# Patient Record
Sex: FEMALE | Race: WHITE | NOT HISPANIC OR LATINO | Employment: OTHER
[De-identification: names, ages, dates, MRNs, and addresses within clinical notes are randomized per-mention and may not be internally consistent; named-entity substitution may affect disease eponyms.]

---

## 2020-06-09 ENCOUNTER — RESULTS ONLY (OUTPATIENT)
Dept: LAB | Age: 30
End: 2020-06-09

## 2020-06-09 ENCOUNTER — APPOINTMENT (OUTPATIENT)
Dept: URGENT CARE | Facility: URGENT CARE | Age: 30
End: 2020-06-09

## 2020-06-10 LAB
SARS-COV-2 RNA SPEC QL NAA+PROBE: NOT DETECTED
SPECIMEN SOURCE: NORMAL

## 2020-11-06 ENCOUNTER — AMBULATORY - HEALTHEAST (OUTPATIENT)
Dept: FAMILY MEDICINE | Facility: CLINIC | Age: 30
End: 2020-11-06

## 2020-11-06 ENCOUNTER — VIRTUAL VISIT (OUTPATIENT)
Dept: FAMILY MEDICINE | Facility: OTHER | Age: 30
End: 2020-11-06

## 2020-11-06 DIAGNOSIS — Z20.822 SUSPECTED COVID-19 VIRUS INFECTION: ICD-10-CM

## 2020-11-06 NOTE — PROGRESS NOTES
"Date: 2020 07:33:43  Clinician: Buster Liriano  Clinician NPI: [id number]  Patient: Princess Estrella  Patient : 1990  Patient Address: [address] [place] [address]  Patient Phone: [phone]  Visit Protocol: URI  Patient Summary:  Princess is a 30 year old ( : 1990 ) female who initiated a [facility] Visit for COVID-19 (Coronavirus) evaluation and screening. When asked the question \"Please sign me up to receive news, health information and promotions. \", Princess responded \"No\".    Princess states her symptoms started 1-2 days ago.   Her symptoms consist of vomiting, myalgia, chills, malaise, a sore throat, ageusia, a headache, nasal congestion, nausea, and anosmia. Princess also feels feverish but was unable to measure her temperature.   Symptom details     Nasal secretions: The color of her mucus is yellow.    Sore throat: Princess reports having moderate throat pain (4-6 on a 10 point pain scale), does not have exudate on her tonsils, and can swallow liquids. She is not sure if the lymph nodes in her neck are enlarged. A rash has not appeared on the skin since the sore throat started.     Headache: She states the headache is moderate (4-6 on a 10 point pain scale).      Princess denies having rhinitis, facial pain or pressure, teeth pain, diarrhea, ear pain, wheezing, and cough. She also denies taking antibiotic medication in the past month, having recent facial or sinus surgery in the past 60 days, and having a sinus infection within the past year. She is not experiencing dyspnea.   Precipitating events  Within the past week, Princess has not been exposed to someone with strep throat. She has not recently been exposed to someone with influenza. Princess has not been in close contact with any high risk individuals.   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   Princess has had a close contact with a laboratory-confirmed COVID-19 patient within 14 days of symptom onset. She was exposed at her work. Date Princess was exposed to the laboratory-confirmed COVID-19 patient: 11/02/2020   Additional information about contact with COVID-19 (Coronavirus) patient as reported by the patient (free text): I have been working directly with a patient who tested positive and employees    Since December 2019, Princess has been tested for COVID-19 and has not had upper respiratory infection or influenza-like illness.      Result of COVID-19 test: Negative     Date of her COVID-19 test: 08/20/2020      Pertinent medical history  Princess does not get yeast infections when she takes antibiotics.   Princess does not need a return to work/school note.   Weight: 150 lbs   Princess does not smoke or use smokeless tobacco.   She denies pregnancy and denies breastfeeding. She has menstruated in the past month.   Weight: 150 lbs    MEDICATIONS: No current medications, ALLERGIES: NKDA  Clinician Response:  Dear Princess,   Need to be retested:     Your symptoms show that you may have coronavirus (COVID-19). This illness can cause fever, cough and trouble breathing. Many people get a mild case and get better on their own. Some people can get very sick.  What should I do?  We would like to test you for this virus.   1. Please call [phone] to schedule your visit. Explain that you were referred by [facility] to have a COVID-19 test. Be ready to share your [facility] visit ID number.  * If you need to schedule in [facility] please call [phone] or for [facility] employees please call [phone].  * If you need to schedule in the [place] area please call  "[phone]. [employer] employees call [phone].  The following will serve as your written order for this COVID Test, ordered by me, for the indication of suspected COVID [Z20.828]: The test will be ordered in [employer], our electronic health record, after you are scheduled. It will show as ordered and authorized by Earnest Washington MD.  Order: COVID-19 (Coronavirus) PCR for SYMPTOMATIC testing from [facility].   2. When it's time for your COVID test:  Stay at least 6 feet away from others. (If someone will drive you to your test, stay in the backseat, as far away from the  as you can.)   Cover your mouth and nose with a mask, tissue or washcloth.  Go straight to the testing site. Don't make any stops on the way there or back.      3.Starting now: Stay home and away from others (self-isolate) until:   You've had no fever---and no medicine that reduces fever---for one full day (24 hours). And...   Your other symptoms have gotten better. For example, your cough or breathing has improved. And...   At least 10 days have passed since your symptoms started.       During this time, don't leave the house except for testing or medical care.   Stay in your own room, even for meals. Use your own bathroom if you can.   Stay away from others in your home. No hugging, kissing or shaking hands. No visitors.  Don't go to work, school or anywhere else.    Clean \"high touch\" surfaces often (doorknobs, counters, handles, etc.). Use a household cleaning spray or wipes. You'll find a full list of  on the EPA website: www.epa.gov/pesticide-registration/list-n-disinfectants-use-against-sars-cov-2.   Cover your mouth and nose with a mask, tissue or washcloth to avoid spreading germs.  Wash your hands and face often. Use soap and water.  Caregivers in these groups are at risk for severe illness due to COVID-19:  o People 65 years and older  o People who live in a nursing home or long-term care facility  o People with chronic disease (lung, " heart, cancer, diabetes, kidney, liver, immunologic)  o People who have a weakened immune system, including those who:   Are in cancer treatment  Take medicine that weakens the immune system, such as corticosteroids  Had a bone marrow or organ transplant  Have an immune deficiency  Have poorly controlled HIV or AIDS  Are obese (body mass index of 40 or higher)  Smoke regularly   o Caregivers should wear gloves while washing dishes, handling laundry and cleaning bedrooms and bathrooms.  o Use caution when washing and drying laundry: Don't shake dirty laundry, and use the warmest water setting that you can.  o For more tips, go to www.cdc.gov/coronavirus/2019-ncov/downloads/10Things.pdf.    4.Sign up for [employer]. We know it's scary to hear that you might have COVID-19. We want to track your symptoms to make sure you're okay over the next 2 weeks. Please look for an email from [employer]---this is a free, online program that we'll use to keep in touch. To sign up, follow the link in the email. Learn more at [email or web address]  How can I take care of myself?   Get lots of rest. Drink extra fluids (unless a doctor has told you not to).   Take Tylenol (acetaminophen) for fever or pain. If you have liver or kidney problems, ask your family doctor if it's okay to take Tylenol.   Adults can take either:    650 mg (two 325 mg pills) every 4 to 6 hours, or...   1,000 mg (two 500 mg pills) every 8 hours as needed.    Note: Don't take more than 3,000 mg in one day. Acetaminophen is found in many medicines (both prescribed and over-the-counter medicines). Read all labels to be sure you don't take too much.   For children, check the Tylenol bottle for the right dose. The dose is based on the child's age or weight.    If you have other health problems (like cancer, heart failure, an organ transplant or severe kidney disease): Call your specialty clinic if you don't feel better in the next 2 days.       Know  when to call 911. Emergency warning signs include:    Trouble breathing or shortness of breath Pain or pressure in the chest that doesn't go away Feeling confused like you haven't felt before, or not being able to wake up Bluish-colored lips or face.  Where can I get more information?   [facility] -- About COVID-19: [email or web address]   CDC -- What to Do If You're Sick: www.cdc.gov/coronavirus/2019-ncov/about/steps-when-sick.html   CDC -- Ending Home Isolation: www.cdc.gov/coronavirus/2019-ncov/hcp/disposition-in-home-patients.html   [facility] -- Caring for Someone: www.cdc.gov/coronavirus/2019-ncov/if-you-are-sick/care-for-someone.html   [facility] -- Interim Guidance for Hospital Discharge to Home: www.[facility].mn.us/diseases/coronavirus/hcp/hospdischarge.pdf   [facility] clinical trials (COVID-19 research studies): clinicalaffairs.[facility]-clinical-trials    Below are the COVID-19 hotlines at the [place] Department of Health ([facility]). Interpreters are available.    For health questions: Call 594-370-1456 or 1-767.868.9542 (7 a.m. to 7 p.m.) For questions about schools and childcare: Call 890-416-7680 or 1-950.906.3297 (7 a.m. to 7 p.m.)    Diagnosis: Contact with and (suspected) exposure to other viral communicable diseases  Diagnosis ICD: Z20.828

## 2020-11-08 ENCOUNTER — COMMUNICATION - HEALTHEAST (OUTPATIENT)
Dept: SCHEDULING | Facility: CLINIC | Age: 30
End: 2020-11-08

## 2020-11-29 ENCOUNTER — HEALTH MAINTENANCE LETTER (OUTPATIENT)
Age: 30
End: 2020-11-29

## 2021-07-28 ENCOUNTER — LAB REQUISITION (OUTPATIENT)
Dept: LAB | Facility: CLINIC | Age: 31
End: 2021-07-28

## 2021-07-28 PROCEDURE — 86762 RUBELLA ANTIBODY: CPT | Performed by: INTERNAL MEDICINE

## 2021-07-28 PROCEDURE — 86735 MUMPS ANTIBODY: CPT | Performed by: INTERNAL MEDICINE

## 2021-07-28 PROCEDURE — 86481 TB AG RESPONSE T-CELL SUSP: CPT | Performed by: INTERNAL MEDICINE

## 2021-07-28 PROCEDURE — 86765 RUBEOLA ANTIBODY: CPT | Performed by: INTERNAL MEDICINE

## 2021-07-28 PROCEDURE — 86787 VARICELLA-ZOSTER ANTIBODY: CPT | Performed by: INTERNAL MEDICINE

## 2021-07-28 PROCEDURE — 86706 HEP B SURFACE ANTIBODY: CPT | Performed by: INTERNAL MEDICINE

## 2021-07-29 LAB
HBV SURFACE AB SERPL IA-ACNC: 1.22 M[IU]/ML
MEV IGG SER IA-ACNC: 218 AU/ML
MEV IGG SER IA-ACNC: POSITIVE
MUMPS ANTIBODY IGG INSTRUMENT VALUE: 159 AU/ML
MUV IGG SER QL IA: POSITIVE
RUBV IGG SERPL QL IA: 2.44 INDEX
RUBV IGG SERPL QL IA: POSITIVE
VZV IGG SER QL IA: 389.9 INDEX
VZV IGG SER QL IA: POSITIVE

## 2021-07-30 LAB
QUANTIFERON MITOGEN: 10 IU/ML
QUANTIFERON NIL TUBE: 0.08 IU/ML
QUANTIFERON TB1 TUBE: 0.1 IU/ML
QUANTIFERON TB2 TUBE: 0.11

## 2021-08-01 LAB
GAMMA INTERFERON BACKGROUND BLD IA-ACNC: 0.08 IU/ML
M TB IFN-G BLD-IMP: NEGATIVE
M TB IFN-G CD4+ BCKGRND COR BLD-ACNC: 9.92 IU/ML
MITOGEN IGNF BCKGRD COR BLD-ACNC: 0.02 IU/ML
MITOGEN IGNF BCKGRD COR BLD-ACNC: 0.03 IU/ML

## 2021-09-25 ENCOUNTER — HEALTH MAINTENANCE LETTER (OUTPATIENT)
Age: 31
End: 2021-09-25

## 2021-12-15 ENCOUNTER — OFFICE VISIT (OUTPATIENT)
Dept: OBGYN | Facility: CLINIC | Age: 31
End: 2021-12-15
Payer: COMMERCIAL

## 2021-12-15 VITALS
BODY MASS INDEX: 28.94 KG/M2 | WEIGHT: 168.6 LBS | OXYGEN SATURATION: 97 % | HEART RATE: 80 BPM | DIASTOLIC BLOOD PRESSURE: 77 MMHG | SYSTOLIC BLOOD PRESSURE: 118 MMHG

## 2021-12-15 DIAGNOSIS — Z32.01 PREGNANCY EXAMINATION OR TEST, POSITIVE RESULT: ICD-10-CM

## 2021-12-15 DIAGNOSIS — N91.2 AMENORRHEA: Primary | ICD-10-CM

## 2021-12-15 LAB — HCG UR QL: POSITIVE

## 2021-12-15 PROCEDURE — 81025 URINE PREGNANCY TEST: CPT | Performed by: OBSTETRICS & GYNECOLOGY

## 2021-12-15 PROCEDURE — 99203 OFFICE O/P NEW LOW 30 MIN: CPT | Performed by: OBSTETRICS & GYNECOLOGY

## 2021-12-15 RX ORDER — PRENATAL VIT/IRON FUM/FOLIC AC 27MG-0.8MG
1 TABLET ORAL DAILY
COMMUNITY
End: 2023-05-02

## 2021-12-15 NOTE — PROGRESS NOTES
Princess is a 31 year old,  1 para 0 who presents today with absence of menses. LMP was 10/10/21.  She has had a home pregnancy test that was positive.  She has had associated nausea and fatigue.      OB History    Para Term  AB Living   1 0 0 0 0 0   SAB IAB Ectopic Multiple Live Births   0 0 0 0 0      # Outcome Date GA Lbr Rosendo/2nd Weight Sex Delivery Anes PTL Lv   1 Current                Gynecological History         LMP:  10/10/2021      No STD/ No PID/ No IUD use  No abnormal pap smears       Past Medical History:   Diagnosis Date     No pertinent past medical history        Past Surgical History:   Procedure Laterality Date     NO HISTORY OF SURGERY         No Known Allergies    Current Outpatient Medications   Medication Sig Dispense Refill     Prenatal Vit-Fe Fumarate-FA (PRENATAL MULTIVITAMIN W/IRON) 27-0.8 MG tablet Take 1 tablet by mouth daily         Social History     Tobacco Use     Smoking status: Former Smoker     Types: Cigarettes     Smokeless tobacco: Never Used   Substance Use Topics     Alcohol use: No     Drug use: No       No family history on file.      ROS:    4 point ROS including Respiratory, CV, GI and , other than that noted in the HPI and the PMH,  is negative     EXAM:  /77 (BP Location: Right arm, Cuff Size: Adult Regular)   Pulse 80   Wt 76.5 kg (168 lb 9.6 oz)   LMP 10/10/2021   SpO2 97%   BMI 28.94 kg/m    General:  WNWD female, NAD  Alert  Oriented x 3  Gait:  Normal  Skin:  Normal skin turgor  HEENT:  NC/AT, EOMI  Abdomen:  Non-tender, non-distended.  Beside ultrasound performed and CRL =9.3 weeks.  FHT's not heard on doppler   Pelvic exam:  Not performed  Extremities:  No clubbing, no cyanosis and no edema.      A: Missed Menses  Weeks gestation: 9.3 weeks  FRANKY: 2021    P: 1) Prenatal vitamins   2) [facility] ob packet given to patient.  Diet, exercise, work, and environmental hazards reviewed.  Discussed avoidance of tobacco, ETOH, and street  drugs. Signs and symptoms to monitor for and report discussed. Will schedule first OB visit at 10-12 weeks gestation.  She will do this in [place] after she moves.   3) Ultrasound is ordered due to not hearing FHTs on doppler    20 minutes face to face time was spent with the patient today entirely in education and counseling regarding first trimester anticipatory guidance    Lalo Faust MD

## 2021-12-22 ENCOUNTER — ANCILLARY PROCEDURE (OUTPATIENT)
Dept: ULTRASOUND IMAGING | Facility: CLINIC | Age: 31
End: 2021-12-22
Attending: OBSTETRICS & GYNECOLOGY
Payer: COMMERCIAL

## 2021-12-22 DIAGNOSIS — Z32.01 PREGNANCY EXAMINATION OR TEST, POSITIVE RESULT: ICD-10-CM

## 2021-12-22 DIAGNOSIS — N91.2 AMENORRHEA: ICD-10-CM

## 2021-12-22 LAB — RADIOLOGIST FLAGS: ABNORMAL

## 2021-12-22 PROCEDURE — 76801 OB US < 14 WKS SINGLE FETUS: CPT | Performed by: RADIOLOGY

## 2021-12-28 ENCOUNTER — OFFICE VISIT (OUTPATIENT)
Dept: OBGYN | Facility: CLINIC | Age: 31
End: 2021-12-28
Payer: COMMERCIAL

## 2021-12-28 VITALS
SYSTOLIC BLOOD PRESSURE: 113 MMHG | DIASTOLIC BLOOD PRESSURE: 72 MMHG | OXYGEN SATURATION: 95 % | HEART RATE: 88 BPM | BODY MASS INDEX: 29.08 KG/M2 | WEIGHT: 169.4 LBS

## 2021-12-28 DIAGNOSIS — O02.1 MISSED ABORTION WITH FETAL DEMISE BEFORE 20 COMPLETED WEEKS OF GESTATION: Primary | ICD-10-CM

## 2021-12-28 LAB
ABO/RH(D): NORMAL
ANTIBODY SCREEN: NEGATIVE
SPECIMEN EXPIRATION DATE: NORMAL

## 2021-12-28 PROCEDURE — 99213 OFFICE O/P EST LOW 20 MIN: CPT | Performed by: OBSTETRICS & GYNECOLOGY

## 2021-12-28 PROCEDURE — 36415 COLL VENOUS BLD VENIPUNCTURE: CPT | Performed by: OBSTETRICS & GYNECOLOGY

## 2021-12-28 PROCEDURE — 86901 BLOOD TYPING SEROLOGIC RH(D): CPT | Performed by: OBSTETRICS & GYNECOLOGY

## 2021-12-28 PROCEDURE — 86850 RBC ANTIBODY SCREEN: CPT | Performed by: OBSTETRICS & GYNECOLOGY

## 2021-12-28 PROCEDURE — 86900 BLOOD TYPING SEROLOGIC ABO: CPT | Performed by: OBSTETRICS & GYNECOLOGY

## 2021-12-28 NOTE — PROGRESS NOTES
Princess is a 31 year old  (Patient's last menstrual period was 10/10/2021.) at 10.5 weeks based on LMP.  She is here with her partner today for follow up of the  Fetal demise seen on the ultrasound last week.  She has had some cramping.  She has not had any bleeding.     She had the ultrasound and the following is reviewed with her and her partner.   US OB < 14 WEEKS SINGLE-TRANSABDOMINAL 2021 1:15 PM  CLINICAL HISTORY: unable to hear FHT's; Amenorrhea; Pregnancy  examination or test, positive result  TECHNIQUE: Transabdominal scans were performed. Endovaginal ultrasound  was performed to better visualize the embryo.  COMPARISON: None.  FINDINGS:  UTERUS: Single normal appearing intrauterine gestation sac.  CRL: measures 29 mm, equals 9 weeks and 5 days.  FETAL HEART RATE: No fetal cardiac activity is identified.  AMNIOTIC FLUID: Normal.  PLACENTA: Not yet formed. No evidence for sub-chorionic hemorrhage.  RIGHT OVARY: Normal.  LEFT OVARY: Normal.                                                               IMPRESSION:   1.  Single intrauterine gestation at 9 weeks and 5 days. No fetal  cardiac activity is identified on this exam. Per the guidelines below,  these findings are consistent with a failed early pregnancy.  Findings Diagnostic of Pregnancy Failure     CRL >7mm and no FHR  MSD >25 mm and no embryo  Embryo with no FHR >11 days after a previous US showed a gestational  sac with a yolk sac.  Embryo with no FHR >2 weeks after a previous US showed a gestational  sac without a yolk sac.     Reference: Diagnostic Criteria for Nonviable Pregnancy Early in the  First Trimester. Ultrasound Quarterly; 30(1): 3-9      Past Medical History:   Diagnosis Date     No pertinent past medical history        Past Surgical History:   Procedure Laterality Date     NO HISTORY OF SURGERY          No Known Allergies    Current Outpatient Medications   Medication Sig Dispense Refill     Prenatal Vit-Fe  Fumarate-FA (PRENATAL MULTIVITAMIN W/IRON) 27-0.8 MG tablet Take 1 tablet by mouth daily (Patient not taking: Reported on 2021)         Social History     Socioeconomic History     Marital status: Single     Spouse name: Not on file     Number of children: Not on file     Years of education: Not on file     Highest education level: Not on file   Occupational History     Not on file   Tobacco Use     Smoking status: Former Smoker     Types: Cigarettes     Smokeless tobacco: Never Used   Substance and Sexual Activity     Alcohol use: No     Drug use: No     Sexual activity: Yes     Partners: Male     Birth control/protection: None   Other Topics Concern     Not on file   Social History Narrative    05/16/15: 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     Social Determinants of Health     Financial Resource Strain: Not on file   Food Insecurity: Not on file   Transportation Needs: Not on file   Physical Activity: Not on file   Stress: Not on file   Social Connections: Not on file   Intimate Partner Violence: Not on file   Housing Stability: Not on file       No family history on file.      Review of Systems:  10 point ROS of systems including Constitutional, Eyes, Respiratory, Cardiovascular, Gastroenterology, Genitourinary, Integumentary, Muscularskeletal, Psychiatric were all negative except for pertinent positives noted in my HPI and in the PMH.      Exam  /72 (BP Location: Right arm)   Pulse 88   Wt 76.8 kg (169 lb 6.4 oz)   LMP 10/10/2021   SpO2 95%   BMI 29.08 kg/m    General:  WNWD female, NAD  Alert  Oriented x 3  Gait:  Normal  Skin:  Normal skin turgor  HEENT:  NC/AT, EOMI  Abdomen:  Non-tender, non-distended.  Pelvic exam:  Not performed  Extremities:  No clubbing, no cyanosis and no edema.      Assessment:  Missed      Plan:  Blood Type  Reviewed that miscarriage occurs ~ 1 in 5 pregnancy events and that there was no direct event or prevention  that the patient  could have avoided or performed.  Discussed that there are many etiologies for miscarriage, the most common being a genetic anomaly. The risk for a miscarriage increases with advancing maternal age due to a higher incidence of conceptuses with a chromosomal aneuploidy. The risk may approach 75% in women who are 45 years of age and older.  In about 50% of couples with recurrent pregnancy loss, the etiology remains unknown despite a thorough evaluation and is therefore classified as idiopathic. It is estimated that couples with idiopathic recurrent pregnancy loss can have up to a 75% chance of having a successful pregnancy. Reviewed that risk of miscarriage for next pregnancy is not elevated by the current event.  Commonly reported causes of recurrent pregnancy loss include the following:      Endocrine      Environmental agents      Maternal factors (acquired, inherited, structural)      Chromosomal and single gene disorders  We reviewed options of expectant management, D&C, and medical therapy (mefiprestone and cytotec).  Reviewed risks and benefits of all options.  All questions answered.  Patient is opting for observational management.  She will let me know if she does not pass tissue and she desires an interventional method  Questions seem to be answered. .    Lalo Faust MD

## 2022-01-15 ENCOUNTER — HEALTH MAINTENANCE LETTER (OUTPATIENT)
Age: 32
End: 2022-01-15

## 2022-12-26 ENCOUNTER — HEALTH MAINTENANCE LETTER (OUTPATIENT)
Age: 32
End: 2022-12-26

## 2023-04-16 ENCOUNTER — HEALTH MAINTENANCE LETTER (OUTPATIENT)
Age: 33
End: 2023-04-16

## 2023-05-01 ENCOUNTER — MEDICAL CORRESPONDENCE (OUTPATIENT)
Dept: HEALTH INFORMATION MANAGEMENT | Facility: CLINIC | Age: 33
End: 2023-05-01
Payer: COMMERCIAL

## 2023-05-02 ENCOUNTER — ANCILLARY PROCEDURE (OUTPATIENT)
Dept: GENERAL RADIOLOGY | Facility: CLINIC | Age: 33
End: 2023-05-02
Attending: STUDENT IN AN ORGANIZED HEALTH CARE EDUCATION/TRAINING PROGRAM
Payer: COMMERCIAL

## 2023-05-02 ENCOUNTER — OFFICE VISIT (OUTPATIENT)
Dept: FAMILY MEDICINE | Facility: CLINIC | Age: 33
End: 2023-05-02
Payer: COMMERCIAL

## 2023-05-02 VITALS
OXYGEN SATURATION: 96 % | TEMPERATURE: 97.7 F | HEART RATE: 81 BPM | RESPIRATION RATE: 16 BRPM | WEIGHT: 168.2 LBS | DIASTOLIC BLOOD PRESSURE: 73 MMHG | SYSTOLIC BLOOD PRESSURE: 100 MMHG | HEIGHT: 64 IN | BODY MASS INDEX: 28.71 KG/M2

## 2023-05-02 DIAGNOSIS — Z00.00 ROUTINE GENERAL MEDICAL EXAMINATION AT A HEALTH CARE FACILITY: Primary | ICD-10-CM

## 2023-05-02 DIAGNOSIS — Z30.011 ENCOUNTER FOR INITIAL PRESCRIPTION OF CONTRACEPTIVE PILLS: ICD-10-CM

## 2023-05-02 DIAGNOSIS — Z11.4 SCREENING FOR HIV (HUMAN IMMUNODEFICIENCY VIRUS): ICD-10-CM

## 2023-05-02 DIAGNOSIS — M25.441 FINGER JOINT SWELLING, RIGHT: ICD-10-CM

## 2023-05-02 DIAGNOSIS — Z11.59 NEED FOR HEPATITIS C SCREENING TEST: ICD-10-CM

## 2023-05-02 LAB
ALBUMIN SERPL BCG-MCNC: 4.6 G/DL (ref 3.5–5.2)
ALP SERPL-CCNC: 51 U/L (ref 35–104)
ALT SERPL W P-5'-P-CCNC: 14 U/L (ref 10–35)
ANION GAP SERPL CALCULATED.3IONS-SCNC: 13 MMOL/L (ref 7–15)
AST SERPL W P-5'-P-CCNC: 19 U/L (ref 10–35)
BASOPHILS # BLD AUTO: 0 10E3/UL (ref 0–0.2)
BASOPHILS NFR BLD AUTO: 1 %
BILIRUB SERPL-MCNC: 0.3 MG/DL
BUN SERPL-MCNC: 6 MG/DL (ref 6–20)
CALCIUM SERPL-MCNC: 9.8 MG/DL (ref 8.6–10)
CHLORIDE SERPL-SCNC: 102 MMOL/L (ref 98–107)
CREAT SERPL-MCNC: 0.8 MG/DL (ref 0.51–0.95)
CRP SERPL-MCNC: <3 MG/L
DEPRECATED HCO3 PLAS-SCNC: 26 MMOL/L (ref 22–29)
EOSINOPHIL # BLD AUTO: 0.2 10E3/UL (ref 0–0.7)
EOSINOPHIL NFR BLD AUTO: 3 %
ERYTHROCYTE [DISTWIDTH] IN BLOOD BY AUTOMATED COUNT: 13.8 % (ref 10–15)
ERYTHROCYTE [SEDIMENTATION RATE] IN BLOOD BY WESTERGREN METHOD: 5 MM/HR (ref 0–20)
GFR SERPL CREATININE-BSD FRML MDRD: >90 ML/MIN/1.73M2
GLUCOSE SERPL-MCNC: 92 MG/DL (ref 70–99)
HCG UR QL: NEGATIVE
HCT VFR BLD AUTO: 40.6 % (ref 35–47)
HGB BLD-MCNC: 13.5 G/DL (ref 11.7–15.7)
LYMPHOCYTES # BLD AUTO: 2.3 10E3/UL (ref 0.8–5.3)
LYMPHOCYTES NFR BLD AUTO: 37 %
MCH RBC QN AUTO: 28.2 PG (ref 26.5–33)
MCHC RBC AUTO-ENTMCNC: 33.3 G/DL (ref 31.5–36.5)
MCV RBC AUTO: 85 FL (ref 78–100)
MONOCYTES # BLD AUTO: 0.4 10E3/UL (ref 0–1.3)
MONOCYTES NFR BLD AUTO: 7 %
NEUTROPHILS # BLD AUTO: 3.4 10E3/UL (ref 1.6–8.3)
NEUTROPHILS NFR BLD AUTO: 53 %
PLATELET # BLD AUTO: 289 10E3/UL (ref 150–450)
POTASSIUM SERPL-SCNC: 3.7 MMOL/L (ref 3.4–5.3)
PROT SERPL-MCNC: 7.1 G/DL (ref 6.4–8.3)
RBC # BLD AUTO: 4.78 10E6/UL (ref 3.8–5.2)
SODIUM SERPL-SCNC: 141 MMOL/L (ref 136–145)
WBC # BLD AUTO: 6.3 10E3/UL (ref 4–11)

## 2023-05-02 PROCEDURE — 81025 URINE PREGNANCY TEST: CPT | Performed by: STUDENT IN AN ORGANIZED HEALTH CARE EDUCATION/TRAINING PROGRAM

## 2023-05-02 PROCEDURE — 86803 HEPATITIS C AB TEST: CPT | Performed by: STUDENT IN AN ORGANIZED HEALTH CARE EDUCATION/TRAINING PROGRAM

## 2023-05-02 PROCEDURE — 85025 COMPLETE CBC W/AUTO DIFF WBC: CPT | Performed by: STUDENT IN AN ORGANIZED HEALTH CARE EDUCATION/TRAINING PROGRAM

## 2023-05-02 PROCEDURE — 80053 COMPREHEN METABOLIC PANEL: CPT | Performed by: STUDENT IN AN ORGANIZED HEALTH CARE EDUCATION/TRAINING PROGRAM

## 2023-05-02 PROCEDURE — 73130 X-RAY EXAM OF HAND: CPT | Mod: TC | Performed by: RADIOLOGY

## 2023-05-02 PROCEDURE — 86140 C-REACTIVE PROTEIN: CPT | Performed by: STUDENT IN AN ORGANIZED HEALTH CARE EDUCATION/TRAINING PROGRAM

## 2023-05-02 PROCEDURE — 99385 PREV VISIT NEW AGE 18-39: CPT | Performed by: STUDENT IN AN ORGANIZED HEALTH CARE EDUCATION/TRAINING PROGRAM

## 2023-05-02 PROCEDURE — 87389 HIV-1 AG W/HIV-1&-2 AB AG IA: CPT | Performed by: STUDENT IN AN ORGANIZED HEALTH CARE EDUCATION/TRAINING PROGRAM

## 2023-05-02 PROCEDURE — 85652 RBC SED RATE AUTOMATED: CPT | Performed by: STUDENT IN AN ORGANIZED HEALTH CARE EDUCATION/TRAINING PROGRAM

## 2023-05-02 PROCEDURE — 99214 OFFICE O/P EST MOD 30 MIN: CPT | Mod: 25 | Performed by: STUDENT IN AN ORGANIZED HEALTH CARE EDUCATION/TRAINING PROGRAM

## 2023-05-02 PROCEDURE — 36415 COLL VENOUS BLD VENIPUNCTURE: CPT | Performed by: STUDENT IN AN ORGANIZED HEALTH CARE EDUCATION/TRAINING PROGRAM

## 2023-05-02 RX ORDER — NORGESTIMATE AND ETHINYL ESTRADIOL 0.25-0.035
1 KIT ORAL DAILY
Qty: 84 TABLET | Refills: 3 | Status: SHIPPED | OUTPATIENT
Start: 2023-05-02 | End: 2024-04-03

## 2023-05-02 ASSESSMENT — ENCOUNTER SYMPTOMS
NAUSEA: 0
WEAKNESS: 0
BREAST MASS: 0
DIZZINESS: 0
ABDOMINAL PAIN: 0
PARESTHESIAS: 0
ARTHRALGIAS: 0
FREQUENCY: 0
CONSTIPATION: 0
CHILLS: 0
EYE PAIN: 0
HEMATOCHEZIA: 0
MYALGIAS: 0
SHORTNESS OF BREATH: 0
DYSURIA: 0
FEVER: 0
JOINT SWELLING: 0
SORE THROAT: 0
COUGH: 0
NERVOUS/ANXIOUS: 1
DIARRHEA: 0
HEARTBURN: 0
PALPITATIONS: 1
HEADACHES: 0
HEMATURIA: 0

## 2023-05-02 ASSESSMENT — PATIENT HEALTH QUESTIONNAIRE - PHQ9
10. IF YOU CHECKED OFF ANY PROBLEMS, HOW DIFFICULT HAVE THESE PROBLEMS MADE IT FOR YOU TO DO YOUR WORK, TAKE CARE OF THINGS AT HOME, OR GET ALONG WITH OTHER PEOPLE: SOMEWHAT DIFFICULT
SUM OF ALL RESPONSES TO PHQ QUESTIONS 1-9: 10
SUM OF ALL RESPONSES TO PHQ QUESTIONS 1-9: 10

## 2023-05-02 NOTE — PROGRESS NOTES
SUBJECTIVE:   CC: Princess is an 32 year old who presents for preventive health visit.   Patient has been advised of split billing requirements and indicates understanding: Yes  Healthy Habits:     Getting at least 3 servings of Calcium per day:  Yes    Bi-annual eye exam:  NO    Dental care twice a year:  NO    Sleep apnea or symptoms of sleep apnea:  Daytime drowsiness    Diet:  Carbohydrate counting    Frequency of exercise:  2-3 days/week    Duration of exercise:  30-45 minutes    Taking medications regularly:  Yes    Medication side effects:  None    PHQ-2 Total Score: 2    Additional concerns today:  Yes     Right Ring Finger  Patient reports prior to being pregnant she started to notice her right ring finger swelling.  She states that during her pregnancy finger as well as other digits with swelling to the pregnancy after her pregnancy her right ring finger remains swollen.  Patient states that recently over the last several months she has noticed increased pain and increased swelling in the joint space with an inability to bend her fingers.  Patient states that she has found difficulty in carrying her child as well as changing his diaper due to pain in the finger.  Patient states that she is concerned and unsure whether and how this pain originated and denies any trauma to the finger    Birth Control  Patient states prior to becoming pregnant she is on birth control and scheduled to return back to normal controlled.  Patient states that she was on Sprintec and would like to return to being on that with control.      Today's PHQ-2 Score:       5/2/2023     2:35 PM   PHQ-2 ( 1999 [employer])   Q1: Little interest or pleasure in doing things 1   Q2: Feeling down, depressed or hopeless 1   PHQ-2 Score 2   Q1: Little interest or pleasure in doing things Several days   Q2: Feeling down, depressed or hopeless Several days   PHQ-2 Score 2       Have you ever done Advance Care Planning? (For example, a Health Directive,  POLST, or a discussion with a medical provider or your loved ones about your wishes): No, advance care planning information given to patient to review.  Patient plans to discuss their wishes with loved ones or provider.      Social History     Tobacco Use     Smoking status: Former     Types: Cigarettes     Smokeless tobacco: Never   Vaping Use     Vaping status: Never Used   Substance Use Topics     Alcohol use: No           2023     2:35 PM   Alcohol Use   Prescreen: >3 drinks/day or >7 drinks/week? No     Reviewed orders with patient.  Reviewed health maintenance and updated orders accordingly - Yes  Lab work is in process  Labs reviewed in EPIC    Breast Cancer Screenin/2/2023     2:35 PM   Breast CA Risk Assessment (FHS-7)   Do you have a family history of breast, colon, or ovarian cancer? No / Unknown         Patient under 40 years of age: Routine Mammogram Screening not recommended.   Pertinent mammograms are reviewed under the imaging tab.    History of abnormal Pap smear: NO - age 30-65 PAP every 5 years with negative HPV co-testing recommended      2014     1:53 PM   PAP / HPV   PAP Negative for squamous intraepithelial lesion or malignancy  Electronically signed by Olivia Estrella CT (ASCP) on 2014 at  9:07 AM         Reviewed and updated as needed this visit by clinical staff   Tobacco  Allergies  Meds              Reviewed and updated as needed this visit by Provider                     Review of Systems   Constitutional: Negative for chills and fever.   HENT: Negative for congestion, ear pain, hearing loss and sore throat.    Eyes: Negative for pain and visual disturbance.   Respiratory: Negative for cough and shortness of breath.    Cardiovascular: Positive for palpitations. Negative for chest pain and peripheral edema.   Gastrointestinal: Negative for abdominal pain, constipation, diarrhea, heartburn, hematochezia and nausea.   Breasts:  Negative for tenderness, breast mass  "and discharge.   Genitourinary: Negative for dysuria, frequency, genital sores, hematuria, pelvic pain, urgency, vaginal bleeding and vaginal discharge.   Musculoskeletal: Negative for arthralgias, joint swelling and myalgias.   Skin: Negative for rash.   Neurological: Negative for dizziness, weakness, headaches and paresthesias.   Psychiatric/Behavioral: Positive for mood changes. The patient is nervous/anxious.           OBJECTIVE:   /73   Pulse 81   Temp 97.7  F (36.5  C) (Temporal)   Resp 16   Ht 1.626 m (5' 4\")   Wt 76.3 kg (168 lb 3.2 oz)   LMP 04/29/2023 (Exact Date)   SpO2 96%   Breastfeeding No   BMI 28.87 kg/m    Physical Exam  GENERAL: healthy, alert and no distress  EYES: Eyes grossly normal to inspection, PERRL and conjunctivae and sclerae normal  HENT: ear canals and TM's normal, nose and mouth without ulcers or lesions  RESP: lungs clear to auscultation - no rales, rhonchi or wheezes  CV: regular rate and rhythm, normal S1 S2, no S3 or S4, no murmur, click or rub, no peripheral edema and peripheral pulses strong  ABDOMEN: soft, nontender, no hepatosplenomegaly, no masses and bowel sounds normal  MS: Right ring finger: Severe swelling along the PIP joint with hardness felt around the palmar side of the hand.  Unable to appreciate if swelling is related to a lipoma versus a cyst.  Small blistering present.  PSYCH: mentation appears normal, affect normal/bright    Diagnostic Test Results:  Labs reviewed in Epic  Results for orders placed or performed in visit on 05/02/23   XR Hand Right G/E 3 Views     Status: None    Narrative    XR HAND RIGHT G/E 3 VIEWS   5/2/2023 3:48 PM     HISTORY: Finger joint swelling, right  COMPARISON: None.       Impression    IMPRESSION: No acute fracture or dislocation. No joint space narrowing  or osseous erosive change. There is marked soft tissue swelling over  the volar aspect of the ring finger proximal interphalangeal joint,  which is nonspecific. " Consider further evaluation with MRI..    LISA BERNARD MD         SYSTEM ID:  [id number]   Results for orders placed or performed in visit on 05/02/23   HCG Qual, Urine ([id number])     Status: Normal   Result Value Ref Range    hCG Urine Qualitative Negative Negative   ESR: Erythrocyte sedimentation rate     Status: Normal   Result Value Ref Range    Erythrocyte Sedimentation Rate 5 0 - 20 mm/hr   CBC with platelets and differential     Status: None   Result Value Ref Range    WBC Count 6.3 4.0 - 11.0 10e3/uL    RBC Count 4.78 3.80 - 5.20 10e6/uL    Hemoglobin 13.5 11.7 - 15.7 g/dL    Hematocrit 40.6 35.0 - 47.0 %    MCV 85 78 - 100 fL    MCH 28.2 26.5 - 33.0 pg    MCHC 33.3 31.5 - 36.5 g/dL    RDW 13.8 10.0 - 15.0 %    Platelet Count 289 150 - 450 10e3/uL    % Neutrophils 53 %    % Lymphocytes 37 %    % Monocytes 7 %    % Eosinophils 3 %    % Basophils 1 %    Absolute Neutrophils 3.4 1.6 - 8.3 10e3/uL    Absolute Lymphocytes 2.3 0.8 - 5.3 10e3/uL    Absolute Monocytes 0.4 0.0 - 1.3 10e3/uL    Absolute Eosinophils 0.2 0.0 - 0.7 10e3/uL    Absolute Basophils 0.0 0.0 - 0.2 10e3/uL   CBC with platelets and differential     Status: None    Narrative    The following orders were created for panel order CBC with platelets and differential.  Procedure                               Abnormality         Status                     ---------                               -----------         ------                     CBC with platelets and d...[[id number]]                      Final result                 Please view results for these tests on the individual orders.       ASSESSMENT/PLAN:   (Z00.00) Routine general medical examination at a health care facility  (primary encounter diagnosis)  Comment: Stable  Plan: REVIEW OF HEALTH MAINTENANCE PROTOCOL ORDERS,         Comprehensive metabolic panel (BMP + Alb, Alk         Phos, ALT, AST, Total. Bili, TP), CBC with         platelets and differential        Pending  labs      (Z11.4) Screening for HIV (human immunodeficiency virus)  Comment: Stable  Plan: HIV Antigen Antibody Combo        Pending labs      (Z11.59) Need for hepatitis C screening test  Comment: Stable  Plan: Hepatitis C Screen Reflex to HCV RNA Quant and         Genotype        Pending labs      (M25.441) Finger joint swelling, right  Comment: Chronic, uncontrolled.  Will obtain x-rays today as well as obtain anti-inflammatory markers to determine if swelling is located within the joint space itself.  Will provide patient with orthopedic referral as concern of swelling is causing patient pain.  Plan: XR Hand Right G/E 3 Views, Orthopedic         Referral, CRP, inflammation, ESR: Erythrocyte         sedimentation rate            (Z30.011) Encounter for initial prescription of contraceptive pills  Comment: Stable.  Pending urine pregnancy screen.  Plan: norgestimate-ethinyl estradiol (ORTHO-CYCLEN)         0.25-35 MG-MCG tablet, HCG Qual, Urine         ([id number])              Patient has been advised of split billing requirements and indicates understanding: Yes      COUNSELING:  Reviewed preventive health counseling, as reflected in patient instructions        She reports that she has quit smoking. Her smoking use included cigarettes. She has never used smokeless tobacco.          IESHA RAMIREZ MD  [facility] [place]  Answers for HPI/ROS submitted by the patient on 5/2/2023  If you checked off any problems, how difficult have these problems made it for you to do your work, take care of things at home, or get along with other people?: Somewhat difficult  PHQ9 TOTAL SCORE: 10

## 2023-05-03 LAB
HCV AB SERPL QL IA: NONREACTIVE
HIV 1+2 AB+HIV1 P24 AG SERPL QL IA: NONREACTIVE

## 2023-05-11 ENCOUNTER — OFFICE VISIT (OUTPATIENT)
Dept: ORTHOPEDICS | Facility: CLINIC | Age: 33
End: 2023-05-11
Attending: STUDENT IN AN ORGANIZED HEALTH CARE EDUCATION/TRAINING PROGRAM
Payer: COMMERCIAL

## 2023-05-11 VITALS — WEIGHT: 168 LBS | BODY MASS INDEX: 28.68 KG/M2 | HEIGHT: 64 IN

## 2023-05-11 DIAGNOSIS — M25.441 FINGER JOINT SWELLING, RIGHT: ICD-10-CM

## 2023-05-11 DIAGNOSIS — R22.31 FINGER MASS, RIGHT: Primary | ICD-10-CM

## 2023-05-11 PROCEDURE — 99244 OFF/OP CNSLTJ NEW/EST MOD 40: CPT | Performed by: FAMILY MEDICINE

## 2023-05-11 NOTE — PATIENT INSTRUCTIONS
# Right Ring Finger Mass: Princess Estrella  was seen today for right 4th digit Mass. Symptoms had been going on for 1 year, increasing in size. On examination there are positive findings of swelling over 4th digit at the PIP joint with ultrasound. Imaging findings showed swelling over 4th digit, ultrasound showing solid components to 4th digit volar mass. Uncertain cause of 4th digit mass. Given solid components will hold off on aspiration attempt for now. Counseled patient on nature of condition and treatment options.  Given this plan as below, follow-up after finger MRI     Image Findings: right 4th digit swelling, small mass noted on ultrasound  Treatment: Activities as tolerated, home exercises given today, 4th digit MRI with and without contrast ordered  Medications/Injections: Limited tylenol/ibuprofen for pain for 1-2 weeks, Topical Voltaren gel, defer for now  Follow-up: After MRI in clinic, can consider aspiration if cyst is noted    Please call [phone]   Ask for my team if you have any questions or concerns    If you have not yet received the influenza vaccine but would like to get one, please call  [phone] or you can schedule via [employer]    It was great seeing you today!    Glen Fagan MD, CAQSM     MRI Scheduling Appointments  [phone] [place]   [phone] [place]

## 2023-05-11 NOTE — PROGRESS NOTES
ASSESSMENT & PLAN    Princess was seen today for pain.    Diagnoses and all orders for this visit:    Finger joint swelling, right  -     Orthopedic  Referral      This issue is acute on chronic and Worsening.    # Right Ring Finger Mass: Princess Estrella  was seen today for right 4th digit Mass. Symptoms had been going on for 1 year, increasing in size. On examination there are positive findings of swelling over 4th digit at the PIP joint with ultrasound. Imaging findings showed swelling over 4th digit, ultrasound showing solid components to 4th digit volar mass. Uncertain cause of 4th digit mass. Given solid components will hold off on aspiration attempt for now. Counseled patient on nature of condition and treatment options.  Given this plan as below, follow-up after finger MRI     Image Findings: right 4th digit swelling, small mass noted on ultrasound  Treatment: Activities as tolerated, home exercises given today, 4th digit MRI with and without contrast ordered  Medications/Injections: Limited tylenol/ibuprofen for pain for 1-2 weeks, Topical Voltaren gel, defer for now  Follow-up: After MRI in clinic, can consider aspiration if cyst is noted    I was present with the resident during the history and exam.  I discussed the case with the resident and agree with the findings as documented in the assessment and plan.     Glen Fagan MD  [facility] [place]    -----  Chief Complaint   Patient presents with     Right Ring Finger - Pain       SUBJECTIVE  Princess Estrella is a/an 32 year old female who is seen in consultation at the request of  Jackelin Hayward M.D. for evaluation of right ring finger. Reviewed PCP notes    The patient is seen by themselves.  The patient is Right handed    Notes onset of localized swelling over the volar aspect of the right 4th digit PIP. Started shortly after starting to go to the gym/lifting ~1 year ago. Has been getting bigger with time,  "especially increased in size during pregnancy. Mild discomfort. Does not significantly limit the use of her hand, but can be uncomfortable with repetitive use (changing diapers, carrying baby, etc). No numbness/tingling.     Onset: 1 years(s) ago. Reports insidious onset without acute precipitating event. Saw PCP 5/2/23 and right hand xrays were performed.   Location of Pain: right 4th PIP joint   Worsened by: gripping, increased use   Better with: nothing   Treatments tried: no treatment tried to date  Associated symptoms: swelling    Orthopedic/Surgical history: NO  Social History/Occupation: Not currently working    No family history pertinent to patient's problem today.     REVIEW OF SYSTEMS:  Review of Systems  Constitutional, HEENT, cardiovascular, pulmonary, gi and gu systems are negative, except as otherwise noted.    OBJECTIVE:  Ht 1.626 m (5' 4\")   Wt 76.2 kg (168 lb)   LMP 04/29/2023 (Exact Date)   BMI 28.84 kg/m     General: healthy, alert and in no distress  HEENT: no scleral icterus or conjunctival erythema  Skin: no suspicious lesions or rash. No jaundice.  CV: distal perfusion intact   Resp: normal respiratory effort without conversational dyspnea   Psych: normal mood and affect  Neuro: Normal light sensory exam of bilateral upper extremities    RIGHT HAND  Inspection:    No swelling, bruising, discoloration. Large mobile mass over the volar aspect of the 4th PIP that transilluminates.   Palpation:   Carpals: normal   Metacarpals: normal   Thumb: normal   Fingers: Mild tenderness over 4th PIP mass.  Range of Motion:    Full active flexion and extension at MCP, PIP, and DIP joints; normal finger cascade without malrotation.  Wrist pronation, supination, and ulnar/radial deviation normal.  Strength:     full, extension full, flexion full, abduction full, adduction full, opposition full  Special Tests:    Positive: none    Negative: none      RADIOLOGY:  I independently ordered, visualized and " reviewed these images with the patient    Results for orders placed or performed in visit on 05/02/23   XR Hand Right G/E 3 Views    Narrative    XR HAND RIGHT G/E 3 VIEWS   5/2/2023 3:48 PM     HISTORY: Finger joint swelling, right  COMPARISON: None.       Impression    IMPRESSION: No acute fracture or dislocation. No joint space narrowing  or osseous erosive change. There is marked soft tissue swelling over  the volar aspect of the ring finger proximal interphalangeal joint,  which is nonspecific. Consider further evaluation with MRI..    LISA BERNARD MD         SYSTEM ID:  [id number]         Review of external notes as documented elsewhere in note  Review of the result(s) of each unique test - reviewed right 4th digit x-rays        Disclaimer: This note consists of symbols derived from keyboarding, dictation and/or voice recognition software. As a result, there may be errors in the script that have gone undetected. Please consider this when interpreting information found in this chart.

## 2023-05-11 NOTE — LETTER
5/11/2023         RE: Princess Estrella  [address] [address]        Dear Colleague,    Thank you for referring your patient, Princess Estrella, to the [facility] [place]. Please see a copy of my visit note below.    ASSESSMENT & PLAN    Princess was seen today for pain.    Diagnoses and all orders for this visit:    Finger joint swelling, right  -     Orthopedic  Referral      This issue is acute on chronic and Worsening.    # Right Ring Finger Mass: Princess Estrella  was seen today for right 4th digit Mass. Symptoms had been going on for 1 year, increasing in size. On examination there are positive findings of swelling over 4th digit at the PIP joint with ultrasound. Imaging findings showed swelling over 4th digit, ultrasound showing solid components to 4th digit volar mass. Uncertain cause of 4th digit mass. Given solid components will hold off on aspiration attempt for now. Counseled patient on nature of condition and treatment options.  Given this plan as below, follow-up after finger MRI     Image Findings: right 4th digit swelling, small mass noted on ultrasound  Treatment: Activities as tolerated, home exercises given today, 4th digit MRI with and without contrast ordered  Medications/Injections: Limited tylenol/ibuprofen for pain for 1-2 weeks, Topical Voltaren gel, defer for now  Follow-up: After MRI in clinic, can consider aspiration if cyst is noted    I was present with the resident during the history and exam.  I discussed the case with the resident and agree with the findings as documented in the assessment and plan.     Glen Fagan MD  [facility] [place]    -----  Chief Complaint   Patient presents with     Right Ring Finger - Pain       SUBJECTIVE  Princess Estrella is a/an 32 year old female who is seen in consultation at the request of  Jackelin Hayward M.D. for evaluation of right ring finger.  "    The patient is seen by themselves.  The patient is Right handed    Notes onset of localized swelling over the volar aspect of the right 4th digit PIP. Started shortly after starting to go to the gym/lifting ~1 year ago. Has been getting bigger with time, especially increased in size during pregnancy. Mild discomfort. Does not significantly limit the use of her hand, but can be uncomfortable with repetitive use (changing diapers, carrying baby, etc). No numbness/tingling.     Onset: 1 years(s) ago. Reports insidious onset without acute precipitating event. Saw PCP 5/2/23 and right hand xrays were performed.   Location of Pain: right 4th PIP joint   Worsened by: gripping, increased use   Better with: nothing   Treatments tried: no treatment tried to date  Associated symptoms: swelling    Orthopedic/Surgical history: NO  Social History/Occupation: Not currently working    No family history pertinent to patient's problem today.     REVIEW OF SYSTEMS:  Review of Systems  Constitutional, HEENT, cardiovascular, pulmonary, gi and gu systems are negative, except as otherwise noted.    OBJECTIVE:  Ht 1.626 m (5' 4\")   Wt 76.2 kg (168 lb)   LMP 04/29/2023 (Exact Date)   BMI 28.84 kg/m     General: healthy, alert and in no distress  HEENT: no scleral icterus or conjunctival erythema  Skin: no suspicious lesions or rash. No jaundice.  CV: distal perfusion intact   Resp: normal respiratory effort without conversational dyspnea   Psych: normal mood and affect  Neuro: Normal light sensory exam of bilateral upper extremities    RIGHT HAND  Inspection:    No swelling, bruising, discoloration. Large mobile mass over the volar aspect of the 4th PIP that transilluminates.   Palpation:   Carpals: normal   Metacarpals: normal   Thumb: normal   Fingers: Mild tenderness over 4th PIP mass.  Range of Motion:    Full active flexion and extension at MCP, PIP, and DIP joints; normal finger cascade without malrotation.  Wrist pronation, " supination, and ulnar/radial deviation normal.  Strength:     full, extension full, flexion full, abduction full, adduction full, opposition full  Special Tests:    Positive: none    Negative: none      RADIOLOGY:  I independently ordered, visualized and reviewed these images with the patient    Results for orders placed or performed in visit on 05/02/23   XR Hand Right G/E 3 Views    Narrative    XR HAND RIGHT G/E 3 VIEWS   5/2/2023 3:48 PM     HISTORY: Finger joint swelling, right  COMPARISON: None.       Impression    IMPRESSION: No acute fracture or dislocation. No joint space narrowing  or osseous erosive change. There is marked soft tissue swelling over  the volar aspect of the ring finger proximal interphalangeal joint,  which is nonspecific. Consider further evaluation with MRI..    LISA BERNARD MD         SYSTEM ID:  [id number]         Review of external notes as documented elsewhere in note  Review of the result(s) of each unique test - reviewed right 4th digit x-rays        Disclaimer: This note consists of symbols derived from keyboarding, dictation and/or voice recognition software. As a result, there may be errors in the script that have gone undetected. Please consider this when interpreting information found in this chart.        Again, thank you for allowing me to participate in the care of your patient.        Sincerely,        Glen Fagan MD

## 2023-05-19 ENCOUNTER — ANCILLARY PROCEDURE (OUTPATIENT)
Dept: MRI IMAGING | Facility: CLINIC | Age: 33
End: 2023-05-19
Attending: FAMILY MEDICINE
Payer: COMMERCIAL

## 2023-05-19 DIAGNOSIS — R22.31 FINGER MASS, RIGHT: ICD-10-CM

## 2023-05-19 DIAGNOSIS — M25.441 FINGER JOINT SWELLING, RIGHT: ICD-10-CM

## 2023-05-19 PROCEDURE — 73223 MRI JOINT UPR EXTR W/O&W/DYE: CPT | Mod: RT | Performed by: RADIOLOGY

## 2023-05-19 PROCEDURE — A9585 GADOBUTROL INJECTION: HCPCS | Performed by: RADIOLOGY

## 2023-05-19 RX ORDER — GADOBUTROL 604.72 MG/ML
7.5 INJECTION INTRAVENOUS ONCE
Status: COMPLETED | OUTPATIENT
Start: 2023-05-19 | End: 2023-05-19

## 2023-05-19 RX ADMIN — GADOBUTROL 7.5 ML: 604.72 INJECTION INTRAVENOUS at 13:17

## 2023-05-22 ENCOUNTER — TELEPHONE (OUTPATIENT)
Dept: ORTHOPEDICS | Facility: CLINIC | Age: 33
End: 2023-05-22
Payer: COMMERCIAL

## 2023-05-22 NOTE — TELEPHONE ENCOUNTER
Attempted to call patient to schedule MRI follow up.  Voicemail was full unable to leave message.      Shonna Padron,  ATC

## 2023-05-23 ENCOUNTER — VIRTUAL VISIT (OUTPATIENT)
Dept: ORTHOPEDICS | Facility: CLINIC | Age: 33
End: 2023-05-23
Payer: COMMERCIAL

## 2023-05-23 DIAGNOSIS — R22.31 FINGER MASS, RIGHT: Primary | ICD-10-CM

## 2023-05-23 PROCEDURE — 99441 PR PHYSICIAN TELEPHONE EVALUATION 5-10 MIN: CPT | Mod: 93 | Performed by: FAMILY MEDICINE

## 2023-05-23 NOTE — LETTER
5/23/2023         RE: Princess Estrella  [address] [facility] [address]        Dear Colleague,    Thank you for referring your patient, Princess Estrella, to the [facility] [place]. Please see a copy of my visit note below.    Princess is a 32 year old who is being evaluated via a billable telephone visit.      What phone number would you like to be contacted at? [phone]  How would you like to obtain your AVS? Mail a copy   Distant Location (provider location):  On-site  Phone call duration: 6 minutes    # Right 4th Digit Mass:  Patient contacted via telephone regarding Right finger MRI results showing mass over 4th digit uncertain etiology but not cystic in appearance. Given this plan to refer to ortho oncology for further evaluation and possible biopsy.  Follow-up with me as needed. Patient understands and agrees with plan.     Glen Fagan MD    Interim History - May 23, 2023  Since last visit on 5/22/2023 patient has persisting finger symptoms.  Would like to review right finger MRI 5/19/23 performed on 5/19/23. No interim injury.       Results for orders placed or performed in visit on 05/19/23   MR Finger Right w/o & w Contrast    Addendum: 5/19/2023    Addendum:    Impression: Peripherally, enhancing subcutaneous soft tissue mass at  forth digit PIP level, abutting/encasing flexor tendon. This is not  consistent with simple fluid. Technically nonspecific, imaging  differential consideration include entity such as fibroma of tendon  sheath, tenosynovial giant cell tumor as well as other neoplastic  process. For definitive diagnosis tissue sampling is needed. Recommend  orthopedic oncology consultation.    [employer]         SYSTEM ID:  [id number]      Narrative    MR RIGHT fourth digit  with and withoutcontrast 5/19/2023 1:32 PM    Techniques: Multiplanar multisequence imaging of the right fourth  digit was obtained with and without administration of  intra-articular  or intravenous contrast using routing protocol.    History: right finger mass, solid components noted on ultrasound;  Finger joint swelling, right; Finger mass, right    Contrast: 7.5 mL Gadavist.     Comparison: Hand radiographs 5/2/2023    Findings:    Along the palmar aspect of the forth (ring finger) proximal  interphalangeal joint, there is a T1 mildly hyperintense, T2  heterogeneous mildly hyperintense to muscle subcutaneous soft tissue  mass, measuring approximately 22 x 10 x 24 mm (medial lateral by  dorsal palmar by proximal distal). Thre is mild peripheral  enhancement. This lesion abuts partially  encase the fourth digit  flexor tendons. No extension deep to the tendon or joint. No  underlying osseous invasion or abnormality.    Osseous structures  Osseous structures: No fracture, stress reaction, avascular necrosis.    Cystlike change change at the second metacarpal head versus joint  fluid.    Joint and periarticular soft tissue    Physiologic amount of joint fluid in the visualized joints.    Muscles and tendons  Muscles: Visualized muscles are unremarkable without evidence of  muscle strain, atrophy or mass.     Tendons: Focal loculated fluid intimate with the fourth flexor tendon  at the level of A2 pulley, maybe ganglion cyst.     With the exception of the aforementioned encasement of the fourth  flexor tendon sheaths, the remaining flexor and extensor tendon  sheaths are intact and unremarkable.      Impression    Impression: Peripherally, enhancing subcutaneous soft tissue mass at  forth digit PIP level, abutting/encasing flexor tendon. This is not  consistent with simple fluid. Technically nonspecific, imaging  differential consideration include entity tenosynovial giant cell  tumor as well as neoplastic process. For definitive diagnosis tissue  sampling is needed. Recommend orthopedic oncology consultation.    I have personally reviewed the examination and initial  interpretation  and I agree with the findings.    VY PUJA         SYSTEM ID:  [id number]           Again, thank you for allowing me to participate in the care of your patient.        Sincerely,        Glen Fagan MD

## 2023-05-23 NOTE — PATIENT INSTRUCTIONS
Patient contacted via telephone regarding Right finger MRI results showing mass over 4th digit uncertain etiology but not cystic in appearance. Given this plan to refer to ortho oncology for further evaluation and possible biopsy.  Follow-up with me as needed. Patient understands and agrees with plan.     Glen Fagan MD

## 2023-05-23 NOTE — PROGRESS NOTES
Princess is a 32 year old who is being evaluated via a billable telephone visit.      What phone number would you like to be contacted at? [phone]  How would you like to obtain your AVS? Mail a copy   Distant Location (provider location):  On-site  Phone call duration: 6 minutes    # Right 4th Digit Mass:  Patient contacted via telephone regarding Right finger MRI results showing mass over 4th digit uncertain etiology but not cystic in appearance. Given this plan to refer to ortho oncology for further evaluation and possible biopsy.  Follow-up with me as needed. Patient understands and agrees with plan.     Glen Fagan MD    Interim History - May 23, 2023  Since last visit on 5/22/2023 patient has persisting finger symptoms.  Would like to review right finger MRI 5/19/23 performed on 5/19/23. No interim injury.       Results for orders placed or performed in visit on 05/19/23   MR Finger Right w/o & w Contrast    Addendum: 5/19/2023    Addendum:    Impression: Peripherally, enhancing subcutaneous soft tissue mass at  forth digit PIP level, abutting/encasing flexor tendon. This is not  consistent with simple fluid. Technically nonspecific, imaging  differential consideration include entity such as fibroma of tendon  sheath, tenosynovial giant cell tumor as well as other neoplastic  process. For definitive diagnosis tissue sampling is needed. Recommend  orthopedic oncology consultation.    VY PUJA         SYSTEM ID:  [id number]      Narrative    MR RIGHT fourth digit  with and withoutcontrast 5/19/2023 1:32 PM    Techniques: Multiplanar multisequence imaging of the right fourth  digit was obtained with and without administration of intra-articular  or intravenous contrast using routing protocol.    History: right finger mass, solid components noted on ultrasound;  Finger joint swelling, right; Finger mass, right    Contrast: 7.5 mL Gadavist.     Comparison: Hand radiographs  5/2/2023    Findings:    Along the palmar aspect of the forth (ring finger) proximal  interphalangeal joint, there is a T1 mildly hyperintense, T2  heterogeneous mildly hyperintense to muscle subcutaneous soft tissue  mass, measuring approximately 22 x 10 x 24 mm (medial lateral by  dorsal palmar by proximal distal). Thre is mild peripheral  enhancement. This lesion abuts partially  encase the fourth digit  flexor tendons. No extension deep to the tendon or joint. No  underlying osseous invasion or abnormality.    Osseous structures  Osseous structures: No fracture, stress reaction, avascular necrosis.    Cystlike change change at the second metacarpal head versus joint  fluid.    Joint and periarticular soft tissue    Physiologic amount of joint fluid in the visualized joints.    Muscles and tendons  Muscles: Visualized muscles are unremarkable without evidence of  muscle strain, atrophy or mass.     Tendons: Focal loculated fluid intimate with the fourth flexor tendon  at the level of A2 pulley, maybe ganglion cyst.     With the exception of the aforementioned encasement of the fourth  flexor tendon sheaths, the remaining flexor and extensor tendon  sheaths are intact and unremarkable.      Impression    Impression: Peripherally, enhancing subcutaneous soft tissue mass at  forth digit PIP level, abutting/encasing flexor tendon. This is not  consistent with simple fluid. Technically nonspecific, imaging  differential consideration include entity tenosynovial giant cell  tumor as well as neoplastic process. For definitive diagnosis tissue  sampling is needed. Recommend orthopedic oncology consultation.    I have personally reviewed the examination and initial interpretation  and I agree with the findings.    VY [employer]         SYSTEM ID:  [id number]

## 2023-05-24 ENCOUNTER — TELEPHONE (OUTPATIENT)
Dept: ORTHOPEDICS | Facility: CLINIC | Age: 33
End: 2023-05-24

## 2023-05-25 NOTE — TELEPHONE ENCOUNTER
DIAGNOSIS: RT Finger Mass   APPOINTMENT DATE: 05/30/2023   NOTES STATUS DETAILS   OFFICE NOTE from referring provider Internal Glen Fagan MD - [facility]   MEDICATION LIST Internal    MRI Internal 05/19/2023 - RT Finger   XRAYS (IMAGES & REPORTS) Internal 05/02/2023 - RT Hand

## 2023-05-30 ENCOUNTER — PRE VISIT (OUTPATIENT)
Dept: ORTHOPEDICS | Facility: CLINIC | Age: 33
End: 2023-05-30

## 2023-05-30 ENCOUNTER — VIRTUAL VISIT (OUTPATIENT)
Dept: ORTHOPEDICS | Facility: CLINIC | Age: 33
End: 2023-05-30
Payer: COMMERCIAL

## 2023-05-30 DIAGNOSIS — R22.31 FINGER MASS, RIGHT: ICD-10-CM

## 2023-05-30 PROCEDURE — 99204 OFFICE O/P NEW MOD 45 MIN: CPT | Mod: VID | Performed by: ORTHOPAEDIC SURGERY

## 2023-05-30 NOTE — LETTER
5/30/2023         RE: Princess Estrella  [address] [facility] [address]        Dear Colleague,    Thank you for referring your patient, Princess Estrella, to the [facility] [place]. Please see a copy of my visit note below.      Patient is a 32-year-old female who has a 3-year history of an enlarging mass in the ring finger of the right hand in the region of the middle phalanx.  She reports that the mass seemed to grow more quickly during her pregnancy which ended in October 2022.  She says the mass does not particularly bother her and she reports that her finger motion is normal.    On exam she has a large mass in the location described above.  Her skin is intact but does have increased vascularity.    Her MRI scan shows a large mass associated with the flexor tendon at the level of the middle phalanx of the right ring finger.  It certainly abuts the neurovascular bundle on either side of the finger.    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  I did discuss that we would recommend surgical biopsy and removal based on frozen section.  She understands this and all her questions were answered.    Regarding risk I did mention the risk of neurovascular injury to the digital vessels or nerves.  She understands this as well.    She stated that she would like the surgery done as soon as possible.  I informed her I probably could not do it until the very end of June or possibly the week of June the 18th.  I offered for her to have Dr. Mera perform the surgery.  She would like this if it can be arranged.    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    Plan: 1.  Leigha to schedule the surgery.  Potentially with Dr. Mera's schedule allows or schedule with me upon my return.  Case request has been filed.  2.  Yesica to contact regarding preoperative  information.  3.  I did send an in basket to Dr. Mera informing him of the possibility that she would not want surgery.    This is an [facility] video visit.  The patient was at home the provider was in [facility] medical [facility].  This virtual visit began at 2:57 PM and ended at 2:27 PM.  Total length of visit was 30 minutes.        Juarez Reveles MD

## 2023-05-30 NOTE — PATIENT INSTRUCTIONS
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    Plan: 1.  Leigha to schedule the surgery.  Potentially with Dr. Mera's schedule allows or schedule with me upon my return.  Case request has been filed.  2.  Yesica to contact regarding preoperative information.  3.  I did send an in basket to Dr. Mera informing him of the possibility that she would not want surgery.

## 2023-05-30 NOTE — NURSING NOTE
Is the patient currently in the state of [place]? YES    Visit mode:VIDEO    If the visit is dropped, the patient can be reconnected by: VIDEO VISIT: Text to cell phone: [phone]    Will anyone else be joining the visit? NO      How would you like to obtain your AVS? MyChart    Are changes needed to the allergy or medication list? NO    Reason for visit: Consult (finger mass)

## 2023-05-30 NOTE — PROGRESS NOTES
Patient is a 32-year-old female who has a 3-year history of an enlarging mass in the ring finger of the right hand in the region of the middle phalanx.  She reports that the mass seemed to grow more quickly during her pregnancy which ended in October 2022.  She says the mass does not particularly bother her and she reports that her finger motion is normal.    On exam she has a large mass in the location described above.  Her skin is intact but does have increased vascularity.    Her MRI scan shows a large mass associated with the flexor tendon at the level of the middle phalanx of the right ring finger.  It certainly abuts the neurovascular bundle on either side of the finger.    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  I did discuss that we would recommend surgical biopsy and removal based on frozen section.  She understands this and all her questions were answered.    Regarding risk I did mention the risk of neurovascular injury to the digital vessels or nerves.  She understands this as well.    She stated that she would like the surgery done as soon as possible.  I informed her I probably could not do it until the very end of June or possibly the week of June the 18th.  I offered for her to have Dr. Mera perform the surgery.  She would like this if it can be arranged.    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    Plan: 1.  Leigha to schedule the surgery.  Potentially with Dr. Mera's schedule allows or schedule with me upon my return.  Case request has been filed.  2.  Yesica to contact regarding preoperative information.  3.  I did send an in basket to Dr. Mera informing him of the possibility that she would not want surgery.    This is an [facility] video visit.  The patient was at home the provider was in his [facility] office on the [facility].  This virtual visit began at 2:57 PM  and ended at 2:27 PM.  Total length of visit was 30 minutes.

## 2023-06-01 ENCOUNTER — PREP FOR PROCEDURE (OUTPATIENT)
Dept: ORTHOPEDICS | Facility: CLINIC | Age: 33
End: 2023-06-01
Payer: COMMERCIAL

## 2023-06-01 ENCOUNTER — TELEPHONE (OUTPATIENT)
Dept: ORTHOPEDICS | Facility: CLINIC | Age: 33
End: 2023-06-01
Payer: COMMERCIAL

## 2023-06-01 PROBLEM — R22.31 FINGER MASS, RIGHT: Status: ACTIVE | Noted: 2023-06-01

## 2023-06-01 NOTE — TELEPHONE ENCOUNTER
Phoned patient to get her scheduled for surgery with Dr. Verma     Patient was unavailable, mailbox is full and unable to provided call back number of voicemail:   [phone].      Will try again.

## 2023-06-01 NOTE — TELEPHONE ENCOUNTER
FUTURE VISIT INFORMATION      SURGERY INFORMATION:    Date: 6/15/23    [facility]    Surgeon:  Luis Mera MD    Anesthesia Type:  MAC with Local    Procedure: biopsy and possible removal: right ring finger,    RECORDS REQUESTED FROM:       Primary Care Provider: doxoth

## 2023-06-01 NOTE — TELEPHONE ENCOUNTER
Patient is scheduled for surgery with Dr. Mera    Spoke with: Princess    Date of Surgery: 6/15/23    Location: [facility]    Informed patient they will need an adult  Yes    Pre op with Provider PAC    Additional imaging/appointments: POP Made    Surgery packet: Mailed    Additional comments: N/A        Alicia Dow on 6/1/2023 at 9:59 AM

## 2023-06-01 NOTE — LETTER
6/1/2023         RE: Princess Estrella  [address] [place] [id number]        Dear Princess,     I have attempted to reach you several times and have not been able to leave a voicemail message.  I am one of the the nurses that works with Dr. Mera. There are a few things that I would like to review prior to the surgery.  Please give me a call at your convenience to discuss surgical education prior to your upcoming surgery with Dr. Mera.       Sincerely,        Krista HEBERT RN/Luis Mera MD  Orthopedics   [facility]

## 2023-06-05 ENCOUNTER — VIRTUAL VISIT (OUTPATIENT)
Dept: SURGERY | Facility: CLINIC | Age: 33
End: 2023-06-05
Payer: COMMERCIAL

## 2023-06-05 ENCOUNTER — ANESTHESIA EVENT (OUTPATIENT)
Dept: SURGERY | Facility: AMBULATORY SURGERY CENTER | Age: 33
End: 2023-06-05
Payer: COMMERCIAL

## 2023-06-05 ENCOUNTER — PRE VISIT (OUTPATIENT)
Dept: SURGERY | Facility: CLINIC | Age: 33
End: 2023-06-05

## 2023-06-05 DIAGNOSIS — Z01.818 PRE-OP EVALUATION: Primary | ICD-10-CM

## 2023-06-05 PROCEDURE — 99202 OFFICE O/P NEW SF 15 MIN: CPT | Mod: VID | Performed by: PHYSICIAN ASSISTANT

## 2023-06-05 ASSESSMENT — PAIN SCALES - GENERAL: PAINLEVEL: NO PAIN (0)

## 2023-06-05 ASSESSMENT — ENCOUNTER SYMPTOMS: SEIZURES: 0

## 2023-06-05 ASSESSMENT — LIFESTYLE VARIABLES: TOBACCO_USE: 1

## 2023-06-05 NOTE — PROGRESS NOTES
Princess is a 32 year old who is being evaluated via a billable video visit.      How would you like to obtain your AVS? MyChart  If the video visit is dropped, the invitation should be resent by: Text to cell phone: [phone]                HPI                 Review of Systems             Physical Exam

## 2023-06-05 NOTE — PATIENT INSTRUCTIONS
Preparing for Your Surgery      Name:  Princess Estrella   MRN:  [id number]   :  1990   Today's Date:  2023         Arriving for surgery:  Surgery date:  6/15/23  Arrival time:  11:35AM    Restrictions due to COVID 19:    Please maintain social distance.  Masking is optional      parking is available for anyone with mobility limitations or disabilities. (Monday- Friday 7 am- 5 pm)    Please come to:    [facility]  [address] [place] [id number]    Please check in on the 5th floor at the [facility].      What can I eat or drink?    -  You may eat and drink normally until 8 hours prior to arrival  time. (Until 6/15/23, 3:35AM)  -  You may have clear liquids until 2 hours prior to arrival  time. (Until 6/15/23, 9:35AM)    Examples of clear liquids:  Water  Clear broth  Juices (apple, white grape, white cranberry  and cider) without pulp  Noncarbonated, powder based beverages  (lemonade and Rick-Aid)  Sodas (Sprite, 7-Up, ginger ale and seltzer)  Coffee or tea (without milk or cream)  Gatorade    --No alcohol for at least 24 hours before surgery.    Which medicines can I take?    Hold Aspirin for 7 days before surgery.   Hold Multivitamins for 7 days before surgery.  Hold Supplements for 7 days before surgery.  Hold Ibuprofen (Advil, Motrin) for 1 day before surgery--unless otherwise directed by surgeon.  Hold Naproxen (Aleve) for 4 days before surgery.    No alcohol or cannabis products for 24 hours prior to procedure      -  PLEASE TAKE the following medications the day of surgery:     Birth Control Pill    How do I prepare myself?  - Please take 2 showers (one the night prior to surgery and one the morning of surgery) using Scrubcare or Hibiclens soap.    Use this soap only from the neck to your toes.     Leave the soap on your skin for one minute--then rinse thoroughly.      You may use your own shampoo and conditioner. No other hair products.    - Please remove all jewelry and body piercings.  - No lotions, deodorants or fragrance.  - No makeup or fingernail polish.   - Bring your ID and insurance card.    -If you have a Deep Brain Stimulator, a Spinal Cord Stimulator, or any implanted Neuro Device, you must bring the remote to the Surgery Center.         ALL PATIENTS ARE REQUIRED TO HAVE A RESPONSIBLE ADULT TO DRIVE AND BE IN ATTENDANCE WITH THEM FOR 24 HOURS FOLLOWING SURGERY.     Covid testing policy as of 12/06/2022  Your surgeon will notify and schedule you for a COVID test if one is needed before surgery--please direct any questions or COVID symptoms to your surgeon      Questions or Concerns:    -For questions regarding the day of surgery, please contact the Ambulatory Surgery Center at [phone].    -If you have health changes between today and your surgery, please contact your surgeon.     - For questions after surgery, please contact your surgeon's office.

## 2023-06-05 NOTE — H&P
Pre-Operative H & P     CC:  Preoperative exam to assess for increased cardiopulmonary risk while undergoing surgery and anesthesia.    Date of Encounter: 6/5/2023  Primary Care Physician:  Jackelin Hayward     Reason for visit:   Encounter Diagnosis   Name Primary?     Pre-op evaluation Yes       HPI  Princess Estrella is a 32 year old female who presents for pre-operative H & P in preparation for  Procedure Information     Case: [id number] Date/Time: 06/15/23 1305    Procedure: biopsy and possible removal of mass; right ring finger (Right: Axilla)    Anesthesia type: MAC with Local    Diagnosis: Finger mass, right [R22.31]    Pre-op diagnosis: Finger mass, right [R22.31]    Location: [place] [address] / [facility]    Providers: Luis Mera MD          Patient is being evaluated for comorbid conditions of depression    Ms. Estrella has a 3 year history of an enlarging mass on her right ring finger.  Imaging revealed a large mass associated with the flexor tendon at the level of the middle phalanx. She was seen by Dr. Reveles and is now scheduled for the above procedure with Dr. Mera.     History is obtained from the patient and chart review    Hx of abnormal bleeding or anti-platelet use: denies    Menstrual history: Patient's last menstrual period was 04/25/2023 (exact date).     Past Medical History  Past Medical History:   Diagnosis Date     No pertinent past medical history        Past Surgical History  Past Surgical History:   Procedure Laterality Date     NO HISTORY OF SURGERY         Prior to Admission Medications  Current Outpatient Medications   Medication Sig Dispense Refill     norgestimate-ethinyl estradiol (ORTHO-CYCLEN) 0.25-35 MG-MCG tablet Take 1 tablet by mouth daily 84 tablet 3       Allergies  No Known Allergies    Social History  Social History     Socioeconomic History     Marital status: Single     Spouse name: Not on file     Number  of children: Not on file     Years of education: Not on file     Highest education level: Not on file   Occupational History     Not on file   Tobacco Use     Smoking status: Former     Types: Cigarettes     Smokeless tobacco: Never   Vaping Use     Vaping status: Never Used   Substance and Sexual Activity     Alcohol use: No     Drug use: Never     Sexual activity: Yes     Partners: Male     Birth control/protection: None   Other Topics Concern     Not on file   Social History Narrative    05/16/15: 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     Social Determinants of Health     Financial Resource Strain: Not on file   Food Insecurity: Not on file   Transportation Needs: Not on file   Physical Activity: Not on file   Stress: Not on file   Social Connections: Not on file   Intimate Partner Violence: Not on file   Housing Stability: Not on file       Family History  Family History   Problem Relation Age of Onset     Anesthesia Reaction No family hx of      Deep Vein Thrombosis (DVT) No family hx of        Review of Systems  The complete review of systems is negative other than noted in the HPI or here.   Anesthesia Evaluation   Pt has had prior anesthetic. Type: Regional.        ROS/MED HX  ENT/Pulmonary:     (+) tobacco use, Past use,     Neurologic:  - neg neurologic ROS  (-) no seizures and no CVA   Cardiovascular:     (+) -----No previous cardiac testing  (-) taking anticoagulants/antiplatelets   METS/Exercise Tolerance: >4 METS Comment: Going to the gym for weights and cardio without exertional symptoms    Hematologic:  - neg hematologic  ROS  (-) history of blood clots and history of blood transfusion   Musculoskeletal: Comment: Right ring finger mass      GI/Hepatic:  - neg GI/hepatic ROS  (-) liver disease   Renal/Genitourinary:  - neg Renal ROS     Endo:  - neg endo ROS  (-) chronic steroid usage   Psychiatric/Substance Use:     (+) psychiatric history depression     Infectious Disease:  -  neg infectious disease ROS     Malignancy:  - neg malignancy ROS     Other:            Virtual visit -  No vitals were obtained    Physical Exam  Constitutional: Awake, alert, cooperative, no apparent distress, and appears stated age.  HENT: Normocephalic  Respiratory: non labored breathing   Neurologic: Awake, alert, oriented to name, place and time.   Neuropsychiatric: Calm, cooperative. Normal affect.      Prior Labs/Diagnostic Studies   All labs and imaging personally reviewed     EKG/ stress test - if available please see in ROS above   No results found.       View : No data to display.                  The patient's records and results personally reviewed by this provider.     Outside records reviewed from: Care Everywhere      Assessment      Princess Estrella is a 32 year old female seen as a PAC referral for risk assessment and optimization for anesthesia.    Plan/Recommendations  Pt will be optimized for the proposed procedure.  See below for details on the assessment, risk, and preoperative recommendations    NEUROLOGY  - No history of TIA, CVA or seizure  -Post Op delirium risk factors:  No risk identified    ENT  - No current airway concerns.  Will need to be reassessed day of surgery.  Mallampati: Unable to assess  TM: Unable to assess    CARDIAC  - No history of CAD, Hypertension and Afib   -denies cardiac history or symptoms   - METS (Metabolic Equivalents)  Patient performs 4 or more METS exercise without symptoms            Total Score: 0      RCRI-Very low risk: Class 1 0.4% complication rate            Total Score: 0        PULMONARY  CHACHA Low Risk            Total Score: 0      - Denies asthma or inhaler use  - Tobacco History      History   Smoking Status     Former     Types: Cigarettes   Smokeless Tobacco     Never       GI  PONV High Risk  Total Score: 3           1 AN PONV: Pt is Female    1 AN PONV: Patient is not a current smoker    1 AN PONV: Intended Post Op Opioids        /RENAL  -  "Baseline Creatinine WNL    ENDOCRINE    - BMI: Estimated body mass index is 28.84 kg/m  as calculated from the following:    Height as of 5/11/23: 1.626 m (5' 4\").    Weight as of 5/11/23: 76.2 kg (168 lb).  Overweight (BMI 25.0-29.9)  - No history of Diabetes Mellitus    HEME  VTE Low Risk 0.26%            Total Score: 0      - No history of abnormal bleeding or antiplatelet use.    MSK  -finger mass with the above procedure planned     Different anesthesia methods/types have been discussed with the patient, but they are aware that the final plan will be decided by the assigned anesthesia provider on the date of service.    The patient is optimized for their procedure. AVS with information on surgery time/arrival time, meds and NPO status given by nursing staff. No further diagnostic testing indicated.    Please refer to the physical examination documented by the anesthesiologist in the anesthesia record on the day of surgery.    Video-Visit Details    Type of service:  Video Visit    Provider received verbal consent for a Video Visit from the patient? Yes   Video Start Time: 1728  Video End Time:1733    Originating Location (pt. Location): Home    Distant Location (provider location):  Off-site  Mode of Communication:  Video Conference via [email or web address]  On the day of service:     Prep time: 7 minutes  Visit time: 5 minutes  Documentation time: 5 minutes  ------------------------------------------  Total time: 17 minutes      Radha Ha PA-C  Preoperative Assessment Center  [facility]  Clinic and Surgery Center  Phone: [phone]  Fax: [phone]  "

## 2023-06-07 NOTE — TELEPHONE ENCOUNTER
Attempted to reach patient x 2, unable to leave a message - voicemail is full.      PLAN: surgical teaching

## 2023-06-08 NOTE — TELEPHONE ENCOUNTER
Attempted to reach patient x 3.  Unable to leave a message.      PLAN: patient needs surgical teaching.

## 2023-06-13 NOTE — PROGRESS NOTES
A call was placed to the patient and pre-op teaching was performed over the phone.    Teaching Flowsheet   Relevant Diagnosis: Pre-Op Teaching  Teaching Topic: biopsy of right ring finger mass   Person(s) involved in teaching:   Patient     Motivation Level:  Asks Questions: Yes  Eager to Learn: Yes  Cooperative: Yes  Receptive (willing/able to accept information): Yes  Any cultural factors/Mandaeism beliefs that may influence understanding or compliance? No  Comments: none     Patient demonstrates understanding of the following:  Reason for the appointment, diagnosis and treatment plan: Yes  Knowledge of proper use of medications and conditions for which they are ordered (with special attention to potential side effects or drug interactions): Yes  Which situations necessitate calling provider and whom to contact: Yes- discussed the stoplight tool to help assist with this.      Teaching Concerns Addressed:   Comments: medications: takes birth control once daily     Proper use of surgical scrub explain and provided to patient.    Pain management techniques: Yes  Wound Care: Yes  How and/when to access community resources: Yes     Instructional Materials Used/Given:      - Important contact info/ phone numbers  - Map/ location of surgery  - Medications to avoid  - Showering instructions  - Stop light tool    Additionally the following was discussed with patient:  - Patient and boyfriend, will be driving the patient to surgery and staying with them for 24 hours.     In addition to the information above, the following items were also discussed:     -important contact info/ phone numbers  -map/ location of surgery  -medications to avoid  -showering instructions  -stop light tool  -pre-op with pacs completed         -Next step: Surgery date: 6/15/23 with Dr. Mera

## 2023-06-13 NOTE — TELEPHONE ENCOUNTER
Attempted to reach patient x 4. Unable to leave a message, voicemail box is full.  Will send letter.

## 2023-06-15 ENCOUNTER — ANESTHESIA (OUTPATIENT)
Dept: SURGERY | Facility: AMBULATORY SURGERY CENTER | Age: 33
End: 2023-06-15
Payer: COMMERCIAL

## 2023-06-15 ENCOUNTER — HOSPITAL ENCOUNTER (OUTPATIENT)
Facility: AMBULATORY SURGERY CENTER | Age: 33
Discharge: HOME | End: 2023-06-15
Attending: ORTHOPAEDIC SURGERY
Payer: COMMERCIAL

## 2023-06-15 VITALS
HEIGHT: 64 IN | TEMPERATURE: 97.2 F | BODY MASS INDEX: 28.68 KG/M2 | DIASTOLIC BLOOD PRESSURE: 65 MMHG | RESPIRATION RATE: 14 BRPM | HEART RATE: 64 BPM | OXYGEN SATURATION: 97 % | WEIGHT: 168 LBS | SYSTOLIC BLOOD PRESSURE: 130 MMHG

## 2023-06-15 DIAGNOSIS — R22.31 FINGER MASS, RIGHT: ICD-10-CM

## 2023-06-15 LAB
HCG UR QL: NEGATIVE
INTERNAL QC OK POCT: NORMAL
POCT KIT EXPIRATION DATE: NORMAL
POCT KIT LOT NUMBER: NORMAL

## 2023-06-15 PROCEDURE — 88331 PATH CONSLTJ SURG 1 BLK 1SPC: CPT | Mod: TC | Performed by: ORTHOPAEDIC SURGERY

## 2023-06-15 PROCEDURE — 26111 EXC HAND LES SC 1.5 CM/>: CPT | Mod: F8 | Performed by: ORTHOPAEDIC SURGERY

## 2023-06-15 PROCEDURE — 88331 PATH CONSLTJ SURG 1 BLK 1SPC: CPT | Mod: 26 | Performed by: PATHOLOGY

## 2023-06-15 PROCEDURE — 88305 TISSUE EXAM BY PATHOLOGIST: CPT | Mod: 26 | Performed by: PATHOLOGY

## 2023-06-15 PROCEDURE — 88307 TISSUE EXAM BY PATHOLOGIST: CPT | Mod: 26 | Performed by: PATHOLOGY

## 2023-06-15 PROCEDURE — 26111 EXC HAND LES SC 1.5 CM/>: CPT | Mod: F8

## 2023-06-15 PROCEDURE — 81025 URINE PREGNANCY TEST: CPT | Performed by: PATHOLOGY

## 2023-06-15 RX ORDER — PROPOFOL 10 MG/ML
INJECTION, EMULSION INTRAVENOUS PRN
Status: DISCONTINUED | OUTPATIENT
Start: 2023-06-15 | End: 2023-06-15

## 2023-06-15 RX ORDER — SODIUM CHLORIDE, SODIUM LACTATE, POTASSIUM CHLORIDE, CALCIUM CHLORIDE 600; 310; 30; 20 MG/100ML; MG/100ML; MG/100ML; MG/100ML
INJECTION, SOLUTION INTRAVENOUS CONTINUOUS
Status: DISCONTINUED | OUTPATIENT
Start: 2023-06-15 | End: 2023-06-16 | Stop reason: HOSPADM

## 2023-06-15 RX ORDER — OXYCODONE HYDROCHLORIDE 5 MG/1
5 TABLET ORAL
Status: DISCONTINUED | OUTPATIENT
Start: 2023-06-15 | End: 2023-06-16 | Stop reason: HOSPADM

## 2023-06-15 RX ORDER — PROPOFOL 10 MG/ML
INJECTION, EMULSION INTRAVENOUS CONTINUOUS PRN
Status: DISCONTINUED | OUTPATIENT
Start: 2023-06-15 | End: 2023-06-15

## 2023-06-15 RX ORDER — BUPIVACAINE HYDROCHLORIDE 2.5 MG/ML
INJECTION, SOLUTION INFILTRATION; PERINEURAL PRN
Status: DISCONTINUED | OUTPATIENT
Start: 2023-06-15 | End: 2023-06-15 | Stop reason: HOSPADM

## 2023-06-15 RX ORDER — CEFAZOLIN SODIUM 2 G/50ML
2 SOLUTION INTRAVENOUS
Status: COMPLETED | OUTPATIENT
Start: 2023-06-15 | End: 2023-06-15

## 2023-06-15 RX ORDER — HYDROMORPHONE HYDROCHLORIDE 1 MG/ML
0.4 INJECTION, SOLUTION INTRAMUSCULAR; INTRAVENOUS; SUBCUTANEOUS EVERY 5 MIN PRN
Status: DISCONTINUED | OUTPATIENT
Start: 2023-06-15 | End: 2023-06-16 | Stop reason: HOSPADM

## 2023-06-15 RX ORDER — ONDANSETRON 2 MG/ML
INJECTION INTRAMUSCULAR; INTRAVENOUS PRN
Status: DISCONTINUED | OUTPATIENT
Start: 2023-06-15 | End: 2023-06-15

## 2023-06-15 RX ORDER — IBUPROFEN 200 MG
600 TABLET ORAL EVERY 6 HOURS PRN
Qty: 100 TABLET | Refills: 0 | Status: SHIPPED | OUTPATIENT
Start: 2023-06-15 | End: 2023-10-03

## 2023-06-15 RX ORDER — SENNA AND DOCUSATE SODIUM 50; 8.6 MG/1; MG/1
1 TABLET, FILM COATED ORAL AT BEDTIME
Qty: 10 TABLET | Refills: 0 | Status: SHIPPED | OUTPATIENT
Start: 2023-06-15 | End: 2023-10-03

## 2023-06-15 RX ORDER — OXYCODONE HYDROCHLORIDE 5 MG/1
5 TABLET ORAL EVERY 6 HOURS PRN
Qty: 6 TABLET | Refills: 0 | Status: SHIPPED | OUTPATIENT
Start: 2023-06-15 | End: 2023-06-18

## 2023-06-15 RX ORDER — ACETAMINOPHEN 325 MG/1
TABLET ORAL
Qty: 100 TABLET | Refills: 0 | Status: SHIPPED | OUTPATIENT
Start: 2023-06-15 | End: 2023-10-03

## 2023-06-15 RX ORDER — GABAPENTIN 300 MG/1
300 CAPSULE ORAL
Status: COMPLETED | OUTPATIENT
Start: 2023-06-15 | End: 2023-06-15

## 2023-06-15 RX ORDER — FENTANYL CITRATE 50 UG/ML
50 INJECTION, SOLUTION INTRAMUSCULAR; INTRAVENOUS EVERY 5 MIN PRN
Status: DISCONTINUED | OUTPATIENT
Start: 2023-06-15 | End: 2023-06-16 | Stop reason: HOSPADM

## 2023-06-15 RX ORDER — ONDANSETRON 2 MG/ML
4 INJECTION INTRAMUSCULAR; INTRAVENOUS EVERY 30 MIN PRN
Status: DISCONTINUED | OUTPATIENT
Start: 2023-06-15 | End: 2023-06-16 | Stop reason: HOSPADM

## 2023-06-15 RX ORDER — HYDROMORPHONE HYDROCHLORIDE 1 MG/ML
0.2 INJECTION, SOLUTION INTRAMUSCULAR; INTRAVENOUS; SUBCUTANEOUS EVERY 5 MIN PRN
Status: DISCONTINUED | OUTPATIENT
Start: 2023-06-15 | End: 2023-06-16 | Stop reason: HOSPADM

## 2023-06-15 RX ORDER — ACETAMINOPHEN 325 MG/1
975 TABLET ORAL ONCE
Status: COMPLETED | OUTPATIENT
Start: 2023-06-15 | End: 2023-06-15

## 2023-06-15 RX ORDER — CEFAZOLIN SODIUM 2 G/50ML
2 SOLUTION INTRAVENOUS SEE ADMIN INSTRUCTIONS
Status: DISCONTINUED | OUTPATIENT
Start: 2023-06-15 | End: 2023-06-16 | Stop reason: HOSPADM

## 2023-06-15 RX ORDER — ONDANSETRON 4 MG/1
4 TABLET, ORALLY DISINTEGRATING ORAL EVERY 30 MIN PRN
Status: DISCONTINUED | OUTPATIENT
Start: 2023-06-15 | End: 2023-06-16 | Stop reason: HOSPADM

## 2023-06-15 RX ORDER — DEXAMETHASONE SODIUM PHOSPHATE 4 MG/ML
INJECTION, SOLUTION INTRA-ARTICULAR; INTRALESIONAL; INTRAMUSCULAR; INTRAVENOUS; SOFT TISSUE PRN
Status: DISCONTINUED | OUTPATIENT
Start: 2023-06-15 | End: 2023-06-15

## 2023-06-15 RX ORDER — LIDOCAINE 40 MG/G
CREAM TOPICAL
Status: DISCONTINUED | OUTPATIENT
Start: 2023-06-15 | End: 2023-06-16 | Stop reason: HOSPADM

## 2023-06-15 RX ORDER — FENTANYL CITRATE 50 UG/ML
25 INJECTION, SOLUTION INTRAMUSCULAR; INTRAVENOUS EVERY 5 MIN PRN
Status: DISCONTINUED | OUTPATIENT
Start: 2023-06-15 | End: 2023-06-16 | Stop reason: HOSPADM

## 2023-06-15 RX ADMIN — ACETAMINOPHEN 975 MG: 325 TABLET ORAL at 12:01

## 2023-06-15 RX ADMIN — DEXAMETHASONE SODIUM PHOSPHATE 4 MG: 4 INJECTION, SOLUTION INTRA-ARTICULAR; INTRALESIONAL; INTRAMUSCULAR; INTRAVENOUS; SOFT TISSUE at 13:23

## 2023-06-15 RX ADMIN — GABAPENTIN 300 MG: 300 CAPSULE ORAL at 12:01

## 2023-06-15 RX ADMIN — PROPOFOL 50 MG: 10 INJECTION, EMULSION INTRAVENOUS at 13:20

## 2023-06-15 RX ADMIN — PROPOFOL 200 MCG/KG/MIN: 10 INJECTION, EMULSION INTRAVENOUS at 13:20

## 2023-06-15 RX ADMIN — SODIUM CHLORIDE, SODIUM LACTATE, POTASSIUM CHLORIDE, CALCIUM CHLORIDE: 600; 310; 30; 20 INJECTION, SOLUTION INTRAVENOUS at 12:01

## 2023-06-15 RX ADMIN — CEFAZOLIN SODIUM 2 G: 2 SOLUTION INTRAVENOUS at 13:14

## 2023-06-15 RX ADMIN — ONDANSETRON 4 MG: 2 INJECTION INTRAMUSCULAR; INTRAVENOUS at 13:23

## 2023-06-15 RX ADMIN — PROPOFOL 50 MG: 10 INJECTION, EMULSION INTRAVENOUS at 13:22

## 2023-06-15 ASSESSMENT — LIFESTYLE VARIABLES: TOBACCO_USE: 1

## 2023-06-15 NOTE — BRIEF OP NOTE
[facility] Brief Operative Note    Pre-operative diagnosis: Finger mass, right [R22.31]   Post-operative diagnosis Same   Procedure: Procedure(s):  biopsy and possible removal of mass; right ring finger   Surgeon(s): Surgeon(s) and Role:     * Luis Mera MD - Primary   Estimated blood loss: 5 cc   Specimens: ID Type Source Tests Collected by Time Destination   1 : RIGHT RING FINGER MASS Tissue Finger, Right SURGICAL PATHOLOGY EXAM Luis Mera MD 6/15/2023  1:45 PM    2 : RIGHT RING FINGER MASS IN FORMALIN Tissue Finger, Right SURGICAL PATHOLOGY EXAM Luis Mera MD 6/15/2023  1:48 PM       Findings: See dictation    Weight bearing: Weight bearing as tolerated. Avoid activities with repetitive motion. Keep hand clean.  Dressings for 7 days  APAP, Ibu, Oxy for breakthrough    Follow up with Dr. Mera in 2 weeks.    Bentley Suarez MD  PGY-4 Orthopaedic Surgery

## 2023-06-15 NOTE — ANESTHESIA CARE TRANSFER NOTE
Patient: Princess Estrella    Procedure: Procedure(s):  Removal of mass; right ring finger       Diagnosis: Finger mass, right [R22.31]  Diagnosis Additional Information: No value filed.    Anesthesia Type:   MAC     Note:    Oropharynx: oropharynx clear of all foreign objects and spontaneously breathing  Level of Consciousness: awake  Oxygen Supplementation: room air    Independent Airway: airway patency satisfactory and stable  Dentition: dentition unchanged  Vital Signs Stable: post-procedure vital signs reviewed and stable  Report to RN Given: handoff report given  Patient transferred to: Phase II  Comments: Uneventful transport   Report to RN  Exchanging well; color natl  Pt responds appropriately to command  IV patent  Lips/teeth/dentition as preop status  Questions answered    Handoff Report: Identifed the Patient, Identified the Reponsible Provider, Reviewed the pertinent medical history, Discussed the surgical course, Reviewed Intra-OP anesthesia mangement and issues during anesthesia, Set expectations for post-procedure period and Allowed opportunity for questions and acknowledgement of understanding      Vitals:  Vitals Value Taken Time   /52 06/15/23 1405   Temp 36.3  C (97.3  F) 06/15/23 1405   Pulse 63 06/15/23 1405   Resp 14 06/15/23 1405   SpO2 97 % 06/15/23 1405       Electronically Signed By: ANA MARIA ROBERTS CRNA  Brittany 15, 2023  2:09 PM

## 2023-06-15 NOTE — OP NOTE
DATE OF SURGERY: 6/15/2023    PREOPERATIVE DIAGNOSIS: Right ring finger mass    POSTOPERATIVE DIAGNOSIS: Right ring finger mass    PROCEDURE: Excision right ring finger mass, 2 cm    SURGEON: Luis Mera MD     ASSISTANT: Bentley Merrill MD    PATIENT HISTORY: This patient has a growing mass in the palmar aspect of her right ring finger.  This is suspicious for tenosynovial giant cell tumor.  She presents now to have this excised understanding the risks of numbness tingling stiffness bleeding infection and pain.    DESCRIPTION OF PROCEDURE: The patient underwent successful induction of anesthesia.  The right arm was washed and sterilely prepped and draped.  We did a digital block and then made an incision diagonally across the palmar aspect of the middle phalanx of the right ring finger.  After incising skin sharply I carefully divided subcutaneous tissue and  the tumor from the neurovascular bundles medially and laterally.  I was able to separate the tumor from the tendon and remove it intact except for 1 small nodule.  That nodule was in removed separately and sent for frozen section.  We copiously irrigated and cauterized a few small subcutaneous vessels and then closed with 4-0 nylon in the skin followed by a sterile dry dressing.  The patient was then taken to the recovery room in stable condition.  There were no complications.  I was present for all critical portions of the procedure.  The blood loss is less than 5 mL.    Luis Mera MD

## 2023-06-15 NOTE — DISCHARGE INSTRUCTIONS
Procedure Performed: Right Ring Finger Mass Biopsy and Excision  Attending Surgeon: MD Samaria  Date: 6/15/2023    DIAGNOSIS  1. Finger mass, right        MEDICATIONS   Resume all home medications as directed unless otherwise instructed during this hospitalization. If there is any question, double check with your primary care provider.  Start new discharge medications as directed.    Take 3 tablets of 325 mg Tylenol (acetaminophen) three times per day (total of 9 tablets per 24 hour period) and 3 tablets of Motrin (Ibuprofen) 200 mg three times per day (total of 9 tablets per 24 hour period).    For breakthrough pain use narcotic pain medication if prescribed.    Do not drive or operate machinery while taking narcotic pain medications.   If you are taking other Tylenol containing medicines at home, be sure NOT to exceed 4 gram's (4000 milligrams) of Tylenol per day.   If you are taking pain medications, be sure to take Colace (docusate sodium) as well to prevent constipation. If constipated, try adding another cathartic or enema.  If nausea and vomiting, call the hospital or seek medical attention.    ACTIVITY   Weight bearing: Weight bearing as tolerated. Avoid activities with repetitive motion. Keep hand clean.    DIET  Resume same diet prior to your hospital admission.    WOUND   Leave dressing on for 7 days, then leave open to the air.  Watch for signs and symptoms of infection of your wounds including; pain, redness, swelling, drainage or fever.  If you notice any of these symptoms please call or seek medical attention.    Keep wound clean, dry, and intact.  Do not submerge wounds in water until they are healed. No baths, soaking, swimming, or prolonged water exposure for 4 weeks after surgery.    RETURN   Follow-up with Orthopedic Clinic as directed.     Future Appointments   Date Time Provider Department Center   6/20/2023  2:30 PM Jackelin Hayward MD [facility]   6/22/2023  3:45 PM Juarez Reveles  MD [facility]       Call the [facility] at [phone] during business hours for any symptoms such as:    * Fevers with Temperature greater than 101.5 degrees.   * Pus drainage from wound site.   * Severe pain, not controlled by medication.   * Persistent nausea, vomiting and inablility to tolerate fluids.    If you are receiving care in [place], you may call the Orthopedic clinic at [phone] Option #2.    FOR URGENT PROBLEMS ONLY, after hours or on weekends call the hospital  at [phone] and ask to speak with the orthopedic resident on call.    You may also be seen at our Orthopedic Walk-In clinic that runs 7 days per week from 7a-5p at [address] [place] [address]. You can call the Walk-In Clinic at [phone].    [facility]  Home Care Following Anesthesia  For 24 hours after surgery:  Get plenty of rest.  A responsible adult must stay with you for at least 24 hours after you leave the surgery center.  Do not drive or use heavy equipment.  If you have weakness or tingling, don't drive or use heavy equipment until this feeling goes away.   Do not drink alcohol.   Avoid strenuous or risky activities.  Ask for help when climbing stairs.  You may feel lightheaded.  IF so, sit for a few minutes before standing.  Have someone help you get up.   If you have nausea (feel sick to your stomach): Drink only clear liquids such as apple juice, ginger ale, broth or 7-Up.  Rest may also help.  Be sure to drink enough fluids.  Move to a regular diet as you feel able.   You may have a slight fever.  Call the doctor if your fever is over 100 F (37.7 C) (taken under the tongue) or lasts longer than 24 hours.  You may have a dry mouth, a sore throat, muscle aches or trouble sleeping. These should go away after 24 hours.  Do not make important or legal decisions.   It is recommended to avoid smoking.               Tips for taking pain  medications  To get the best pain relief possible, remember these points:  Take pain medications as directed, before pain becomes severe.  Pain medication can upset your stomach: taking it with food may help.  Constipation is a common side effect of pain medication. Drink plenty of  fluids.  Eat foods high in fiber. Take a stool softener if recommended by your doctor or pharmacist.  Do not drink alcohol, drive or operate machinery while taking pain medications.  Ask about other ways to control pain, such as with heat, ice or relaxation.    Tylenol/Acetaminophen Consumption  To help encourage the safe use of acetaminophen, the makers of TYLENOL  have lowered the maximum daily dose for single-ingredient Extra Strength TYLENOL  (acetaminophen) products sold in the [place] from 8 pills per day (4,000 mg) to 6 pills per day (3,000 mg). The dosing interval has also changed from 2 pills every 4-6 hours to 2 pills every 6 hours.  If you feel your pain relief is insufficient, you may take Tylenol/Acetaminophen in addition to your narcotic pain medication.   Be careful not to exceed 3,000 mg of Tylenol/Acetaminophen in a 24 hour period from all sources.  If you are taking extra strength Tylenol/acetaminophen (500 mg), the maximum dose is 6 tablets in 24 hours.  If you are taking regular strength acetaminophen (325 mg), the maximum dose is 9 tablets in 24 hours.  975 mg of tylenol received at 12 PM, next dose available at 8 PM.     Call a doctor for any of the following:  Signs of infection (fever, growing tenderness at the surgery site, a large amount of drainage or bleeding, severe pain, foul-smelling drainage, redness, swelling).  It has been over 8 to 10 hours since surgery and you are still not able to urinate (pass water).  Headache for over 24 hours.  Signs of Covid-19 infection (temperature over 100 degrees, shortness of breath, cough, loss of taste/smell, generalized body aches, persistent headache, chills, sore throat,  nausea/vomiting/diarrhea)  Your doctor is:       Dr. Luis Mera, Orthopaedics: [phone]               Or dial [phone] and ask for the resident on call for:  Orthopaedics  For emergency care, call the:  [facility] Emergency Department: [phone] (TTY for hearing impaired: [phone])

## 2023-06-15 NOTE — ANESTHESIA POSTPROCEDURE EVALUATION
Patient: Princess Estrella    Procedure: Procedure(s):  Removal of mass; right ring finger       Anesthesia Type:  MAC    Note:  Disposition: Outpatient   Postop Pain Control: Uneventful            Sign Out: Well controlled pain   PONV: No   Neuro/Psych: Uneventful            Sign Out: Acceptable/Baseline neuro status   Airway/Respiratory: Uneventful            Sign Out: Acceptable/Baseline resp. status   CV/Hemodynamics: Uneventful            Sign Out: Acceptable CV status; No obvious hypovolemia; No obvious fluid overload   Other NRE:    DID A NON-ROUTINE EVENT OCCUR?            Last vitals:  Vitals Value Taken Time   /58 06/15/23 1415   Temp 36.3  C (97.3  F) 06/15/23 1405   Pulse 61 06/15/23 1415   Resp 16 06/15/23 1415   SpO2 96 % 06/15/23 1415       Electronically Signed By: Raymundo Ricardo MD  Brittany 15, 2023  2:26 PM

## 2023-06-15 NOTE — ANESTHESIA PREPROCEDURE EVALUATION
Anesthesia Pre-Procedure Evaluation    Patient: Princess Estrella   MRN: [id number] : 1990        Procedure : Procedure(s):  biopsy and possible removal of mass; right ring finger          Past Medical History:   Diagnosis Date     No pertinent past medical history       Past Surgical History:   Procedure Laterality Date     NO HISTORY OF SURGERY        No Known Allergies   Social History     Tobacco Use     Smoking status: Former     Types: Cigarettes     Smokeless tobacco: Never   Vaping Use     Vaping status: Never Used   Substance Use Topics     Alcohol use: No      Wt Readings from Last 1 Encounters:   06/15/23 76.2 kg (168 lb)        Anesthesia Evaluation            ROS/MED HX  ENT/Pulmonary:     (+) tobacco use, Past use,     Neurologic:  - neg neurologic ROS     Cardiovascular:  - neg cardiovascular ROS     METS/Exercise Tolerance:     Hematologic:  - neg hematologic  ROS     Musculoskeletal:  - neg musculoskeletal ROS     GI/Hepatic:  - neg GI/hepatic ROS     Renal/Genitourinary:  - neg Renal ROS     Endo:  - neg endo ROS     Psychiatric/Substance Use:  - neg psychiatric ROS     Infectious Disease:  - neg infectious disease ROS     Malignancy:       Other:            Physical Exam    Airway  airway exam normal           Respiratory Devices and Support         Dental       (+) Modest Abnormalities - crowns, retainers, 1 or 2 missing teeth      Cardiovascular   cardiovascular exam normal          Pulmonary   pulmonary exam normal                OUTSIDE LABS:  CBC:   Lab Results   Component Value Date    WBC 6.3 2023    HGB 13.5 2023    HCT 40.6 2023     2023     BMP:   Lab Results   Component Value Date     2023    POTASSIUM 3.7 2023    CHLORIDE 102 2023    CO2 26 2023    BUN 6.0 2023    CR 0.80 2023    GLC 92 2023     COAGS: No results found for: PTT, INR, FIBR  POC:   Lab Results   Component Value Date    HCG Negative  06/15/2023     HEPATIC:   Lab Results   Component Value Date    ALBUMIN 4.6 05/02/2023    PROTTOTAL 7.1 05/02/2023    ALT 14 05/02/2023    AST 19 05/02/2023    ALKPHOS 51 05/02/2023    BILITOTAL 0.3 05/02/2023     OTHER:   Lab Results   Component Value Date    OMER 9.8 05/02/2023    SED 5 05/02/2023       Anesthesia Plan    ASA Status:  1   NPO Status:  NPO Appropriate    Anesthesia Type: MAC.     - Reason for MAC: straight local not clinically adequate   Induction: Intravenous.   Maintenance: TIVA.        Consents    Anesthesia Plan(s) and associated risks, benefits, and realistic alternatives discussed. Questions answered and patient/representative(s) expressed understanding.     - Discussed: Risks, Benefits and Alternatives for BOTH SEDATION and the PROCEDURE were discussed     - Discussed with:  Patient         Postoperative Care    Pain management: Oral pain medications.   PONV prophylaxis: Ondansetron (or other 5HT-3), Dexamethasone or Solumedrol     Comments:                Jed Cazares MD, MD

## 2023-06-19 LAB
PATH REPORT.COMMENTS IMP SPEC: NORMAL
PATH REPORT.COMMENTS IMP SPEC: NORMAL
PATH REPORT.FINAL DX SPEC: NORMAL
PATH REPORT.GROSS SPEC: NORMAL
PATH REPORT.INTRAOP OBS SPEC DOC: NORMAL
PATH REPORT.MICROSCOPIC SPEC OTHER STN: NORMAL
PATH REPORT.RELEVANT HX SPEC: NORMAL
PHOTO IMAGE: NORMAL

## 2023-06-22 ENCOUNTER — OFFICE VISIT (OUTPATIENT)
Dept: ORTHOPEDICS | Facility: CLINIC | Age: 33
End: 2023-06-22
Payer: COMMERCIAL

## 2023-06-22 DIAGNOSIS — D48.19 GIANT CELL TUMOR OF TENDON SHEATH: Primary | ICD-10-CM

## 2023-06-22 PROCEDURE — 99024 POSTOP FOLLOW-UP VISIT: CPT | Performed by: ORTHOPAEDIC SURGERY

## 2023-06-22 NOTE — PROGRESS NOTES
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 Mera, Brittany 15, 2023    Princess is seen back today for a wound check and review of her pathology results.    She reports she is doing well she is having no pain.  She has no digital numbness and reports a significant reduction in the tightness.    Clinically her finger looks excellent.  She had a volar incision over the middle phalanx which is healing nicely.  She has near normal motion through the MCP and inner phalangeal joints of the operated finger.    I reviewed with her and her  the pathology results of giant cell tumor of tendon sheath.  I reported that when we think the entire tumor was excised and the chance of it coming back is very low.  I emphasized that this is a benign tumor.    Impression: right ring finger Giant cell tumor of tendon sheath. Doing well after excision.    Plan: 1. return in 1 week for suture removal.  2. Hand and finger therapy instructed.

## 2023-06-22 NOTE — LETTER
6/22/2023         RE: Princess Estrella  [address] [facility] [address]        Dear Colleague,    Thank you for referring your patient, Princess Estrella, to the [facility] [place]. Please see a copy of my visit note below.    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 Mera, Brittany 15, 2023    Princess is seen back today for a wound check and review of her pathology results.    She reports she is doing well she is having no pain.  She has no digital numbness and reports a significant reduction in the tightness.    Clinically her finger looks excellent.  She had a volar incision over the middle phalanx which is healing nicely.  She has near normal motion through the MCP and inner phalangeal joints of the operated finger.    I reviewed with her and her  the pathology results of giant cell tumor of tendon sheath.  I reported that when we think the entire tumor was excised and the chance of it coming back is very low.  I emphasized that this is a benign tumor.    Impression: right ring finger Giant cell tumor of tendon sheath. Doing well after excision.    Plan: 1. return in 1 week for suture removal.  2. Hand and finger therapy instructed.        Juarez Reveles MD

## 2023-06-22 NOTE — NURSING NOTE
Chief Complaint   Patient presents with     Surgical Followup     1 wk post-op right ring finger mass removal DOS 6/15/23 // motion check        32 year old  1990            Pain Assessment  Patient Currently in Pain: Yes  0-10 Pain Scale: 3  Primary Pain Location: Finger (Comment which one) (right)           [id number] - [place], [place] - [address] AT [facility] & [address] [place] [address] - [address] [place] [address] [phone]        No Known Allergies        Current Outpatient Medications   Medication     acetaminophen (TYLENOL) 325 MG tablet     ibuprofen (ADVIL/MOTRIN) 200 MG tablet     norgestimate-ethinyl estradiol (ORTHO-CYCLEN) 0.25-35 MG-MCG tablet     SENNA-docusate sodium (SENNA S) 8.6-50 MG tablet     No current facility-administered medications for this visit.

## 2023-06-22 NOTE — PATIENT INSTRUCTIONS
Date of Service: 11/28/2021    PREOPERATIVE DIAGNOSIS:  Necrotic left foot and left lower extremity lymphedema.    POSTOPERATIVE DIAGNOSIS:  Necrotic left foot and left lower extremity lymphedema.    OPERATIVE PROCEDURE:  Left lower extremity below-knee amputation.    OPERATING SURGEON:  Saad Nguyen MD.    ASSISTANT(S):   Bill Christopher SA.  Jenni Cooper SA.    ANESTHESIA:  General endotracheal.    INDICATIONS:  This is a 52-year-old woman with diabetes, morbid obesity, severe left lower extremity lymphedema, who presents with a necrotic left foot, which required emergent debridement for sepsis control.  She is now stabilized and ready to undergo a left BKA.  Risks and benefits of surgery were discussed including risk of bleeding, infection, poor healing, delayed wound healing because of her severe lymphedema.  She understands, she wants to proceed with surgery.  Consent is obtained.    DESCRIPTION OF PROCEDURE:  The patient was taken to the operative suite and placed in supine position.  Her left leg was prepped and draped in sterile fashion.  Time-out was performed.  Skin flaps are created with a magic marker.  The prior fasciotomy scars will be incorporated for her posterior skin flap.  Fortunately, with hospitalization and leg elevation, the lymphedema is improved.  Using Bovie electrocautery,  an incision was made along the anterior tibia, 10 cm below the tibial tuberosity.  This incision was carried down to the posterior skin flaps on each side of the leg.  The posterior skin flap was finished with Bovie electrocautery down along the posterior aspect of the lower leg to the Achilles region.  Bovie electrocautery was then used to complete the skin flaps.  Because of the large size of her leg  and lymphedema, she has numerous venous structures, which took extra time for electrocauterization,  some of them being large, required stick ligation with 2-0 silk ligatures.  The Bovie incision is then used to  Impression: right ring finger Giant cell tumor of tendon sheath. Doing well after excision.    Plan: 1. return in 1 week for suture removal.  2. Hand and finger therapy instructed.   transect the fascial compartments of the anterior fascial compartment along the left anterior side all the way down to the fibula and the incision through the fascia was carried down medially all the way down to the soleus musculature.  Modifier 22 was added to this case because of the increased complexity, severe intensity, because of the very large size of the leg.  She has a BMI of 53 and because of the lymphedema this changes, which increases the time of the surgery by 1 hour.  The dimensions also increased the complexity for a geometric proper closure because of the lymphedema incorporating these changes.  Anterior compartment, muscular compartment was completely transected with Bovie electrocautery.  The anterior tibial vessels are clamped, divided, and ligated with 0 silk ligatures.  On the medial compartment, the dissection was carried through the musculature all the way down to the proximal tibial vessels.  They were sequentially clamped, divided, ligated with 0 silk ligatures.  The sciatic nerve was clamped, divided, ligated with 0 silk ligatures.  The tibia was then transected with the Gigli saw with the anterior bone being bevelled nicely for good curvature.    The Bovie was then used to excise all the muscular tissue in and around the fibula 2 cm above the height of the tibia.  This was transected initially with a Gigli saw and then finished off with the large bone .  The guillotine knife was then used to incise the posterior musculature of the soleus and gastrocnemius all the way to the Achilles posterior flap.  There were numerous small bleeders of the posterior muscular flap that were sequentially cauterized for excellent hemostasis.  All the edges of the muscular fascia were also electrocauterized for excellent hemostasis.  Posterior skin flap fascia was then approximated to the anterior sheath of the leg and closed with interrupted 0 Vicryl sutures, also incorporating the posterior  investing fascia of the posterior skin flap.  The redundant skin of the lymphedema posterior tissue can now exactly be trimmed to the appropriate sizing and was trimmed with Bovie electrocautery.  The subcutaneous tissue was then closed with interrupted 2-0 Vicryl sutures and the skin with staples.  There was excellent coaptation of the skin flaps without undue tension or redundancy.  Sterile dressings are applied along with Kerlix wraps and double long ortho Ace wrap.        Dictated By: Saad Nguyen MD  Signing Provider: Saad Nguyen MD    CW/KB3 ([id number])  DD: 11/28/2021 18:56:33 TD: 11/28/2021 21:04:34    Copy Sent To:

## 2023-06-29 ENCOUNTER — OFFICE VISIT (OUTPATIENT)
Dept: ORTHOPEDICS | Facility: CLINIC | Age: 33
End: 2023-06-29
Payer: COMMERCIAL

## 2023-06-29 DIAGNOSIS — D48.19 GIANT CELL TUMOR OF TENDON SHEATH: Primary | ICD-10-CM

## 2023-06-29 PROCEDURE — 99024 POSTOP FOLLOW-UP VISIT: CPT | Performed by: PHYSICIAN ASSISTANT

## 2023-06-29 NOTE — PROGRESS NOTES
Chief Complaint: Right ring finger suture removal   POSTOPERATIVE DIAGNOSIS: Right ring finger mass     6/15/23 PROCEDURE: Excision right ring finger mass, 2 cm    HPI: Princess is a 32 year old female here for suture removal from her right ring finger s/p the above procedure by Dr. Mera.  Patient reports that her fingers if feeling and is less stiff and swollen.  She has no pain.  She is right handed.  [place].  She is a stay-at-home mom.  No other concerns.    Physical Exam: Princess is a 32 year old female who is alert and oriented and in no distress.  Her right ring finger palmar incision is healing well with no erythema or drainage.  Sutures are present.  She is lacking a few degrees of full extension and about 5 deg of full flexion of the IP joint.  She is intact to light touch.      Pathology:   Final Diagnosis   A, B.  Soft tissue, right ring finger mass, excision:  - Tenosynovial giant cell tumor  - Negative for malignancy       Impression: 32 year old female with giant cell tumor of tendon sheath of right ring finger, doing well.    Plan: Princess's sutures were removed today.  The incision is healing well but I would still wait to soak it in a tub or pool for a couple more days.  She should cover it when at the [place] this weekend.  Avoid repetitive gripping activities for the next 2 weeks and then progress back to all normal activities.  If still having difficulty with motion or activity, call if she wants to do hand therapy.  Otherwise follow-up as needed and come in or call if any sign of a new mass, pain or swelling occurs.  She agrees and all questions have been answered.

## 2023-06-29 NOTE — LETTER
6/29/2023         RE: Princess Estrella  [address] [facility] [address]        Dear Colleague,    Thank you for referring your patient, Princess Estrella, to the [facility] [place]. Please see a copy of my visit note below.    Chief Complaint: Right ring finger suture removal   POSTOPERATIVE DIAGNOSIS: Right ring finger mass     6/15/23 PROCEDURE: Excision right ring finger mass, 2 cm    HPI: Princess is a 32 year old female here for suture removal from her right ring finger s/p the above procedure by Dr. Mera.  Patient reports that her fingers if feeling and is less stiff and swollen.  She has no pain.  She is right handed.  [place] this [place] for the [place].  She is a stay-at-home mom.  No other concerns.    Physical Exam: Princess is a 32 year old female who is alert and oriented and in no distress.  Her right ring finger palmar incision is healing well with no erythema or drainage.  Sutures are present.  She is lacking a few degrees of full extension and about 5 deg of full flexion of the IP joint.  She is intact to light touch.      Pathology:   Final Diagnosis   A, B.  Soft tissue, right ring finger mass, excision:  - Tenosynovial giant cell tumor  - Negative for malignancy       Impression: 32 year old female with giant cell tumor of tendon sheath of right ring finger, doing well.    Plan: Princess's sutures were removed today.  The incision is healing well but I would still wait to soak it in a tub or pool for a couple more days.  She [place].  Avoid repetitive gripping activities for the next 2 weeks and then progress back to all normal activities.  If still having difficulty with motion or activity, call if she wants to do hand therapy.  Otherwise follow-up as needed and come in or call if any sign of a new mass, pain or swelling occurs.  She agrees and all questions have been  answered.      Purvi Allen PA-C

## 2023-06-29 NOTE — NURSING NOTE
Chief Complaint   Patient presents with     Surgical Followup     2 wk post-op right ring finger mass removal DOS 6/15/23 // suture removal       32 year old  1990         Pain Assessment  Patient Currently in Pain: Yes  0-10 Pain Scale: 1  Primary Pain Location: Finger (Comment which one) (right ring)                  [facility] #[id number] - [place], [place] - [address] AT [facility] & [address] [place] [address] - [address] [place] [address] [phone]        No Known Allergies        Current Outpatient Medications   Medication     acetaminophen (TYLENOL) 325 MG tablet     ibuprofen (ADVIL/MOTRIN) 200 MG tablet     norgestimate-ethinyl estradiol (ORTHO-CYCLEN) 0.25-35 MG-MCG tablet     SENNA-docusate sodium (SENNA S) 8.6-50 MG tablet     No current facility-administered medications for this visit.

## 2023-10-02 ASSESSMENT — PATIENT HEALTH QUESTIONNAIRE - PHQ9
SUM OF ALL RESPONSES TO PHQ QUESTIONS 1-9: 10
SUM OF ALL RESPONSES TO PHQ QUESTIONS 1-9: 10
10. IF YOU CHECKED OFF ANY PROBLEMS, HOW DIFFICULT HAVE THESE PROBLEMS MADE IT FOR YOU TO DO YOUR WORK, TAKE CARE OF THINGS AT HOME, OR GET ALONG WITH OTHER PEOPLE: VERY DIFFICULT

## 2023-10-03 ENCOUNTER — OFFICE VISIT (OUTPATIENT)
Dept: FAMILY MEDICINE | Facility: CLINIC | Age: 33
End: 2023-10-03
Payer: COMMERCIAL

## 2023-10-03 VITALS
DIASTOLIC BLOOD PRESSURE: 64 MMHG | TEMPERATURE: 97.9 F | RESPIRATION RATE: 18 BRPM | WEIGHT: 147.8 LBS | BODY MASS INDEX: 25.23 KG/M2 | OXYGEN SATURATION: 98 % | HEIGHT: 64 IN | SYSTOLIC BLOOD PRESSURE: 102 MMHG | HEART RATE: 66 BPM

## 2023-10-03 DIAGNOSIS — Z12.4 CERVICAL CANCER SCREENING: Primary | ICD-10-CM

## 2023-10-03 PROCEDURE — 99207 PR NO BILLABLE SERVICE THIS VISIT: CPT | Performed by: STUDENT IN AN ORGANIZED HEALTH CARE EDUCATION/TRAINING PROGRAM

## 2023-10-03 PROCEDURE — 87624 HPV HI-RISK TYP POOLED RSLT: CPT | Performed by: STUDENT IN AN ORGANIZED HEALTH CARE EDUCATION/TRAINING PROGRAM

## 2023-10-03 PROCEDURE — G0145 SCR C/V CYTO,THINLAYER,RESCR: HCPCS | Performed by: STUDENT IN AN ORGANIZED HEALTH CARE EDUCATION/TRAINING PROGRAM

## 2023-10-03 NOTE — PROGRESS NOTES
"  Assessment & Plan     (Z12.4) Cervical cancer screening  (primary encounter diagnosis)  Comment: Stable. Pap smear completed during an office visit.  Adequate sampling taken during Pap smear and sent to lab. Pending pathology results. Patient informed that results will be available via [employer] or they will be contacted by our pap nursing staff- verbalized understanding.   Plan: Pap Screen with HPV - recommended age 30 - 65         years                         BMI:   Estimated body mass index is 25.37 kg/m  as calculated from the following:    Height as of this encounter: 1.626 m (5' 4\").    Weight as of this encounter: 67 kg (147 lb 12.8 oz).           IESHA RAMIREZ MD  [facility] LILA Sánchez is a 32 year old, presenting for the following health issues:  Gyn Exam        10/3/2023     1:35 PM   Additional Questions   Roomed by Soni       History of Present Illness       Reason for visit:  Pap and problems with sleeping    She eats 2-3 servings of fruits and vegetables daily.She consumes 1 sweetened beverage(s) daily.She exercises with enough effort to increase her heart rate 60 or more minutes per day.  She exercises with enough effort to increase her heart rate 5 days per week.   She is taking medications regularly.     Pt is due for a pap smear               Review of Systems   Constitutional, HEENT, cardiovascular, pulmonary, gi and gu systems are negative, except as otherwise noted.      Objective    /64   Pulse 66   Temp 97.9  F (36.6  C) (Tympanic)   Resp 18   Ht 1.626 m (5' 4\")   Wt 67 kg (147 lb 12.8 oz)   SpO2 98%   BMI 25.37 kg/m    Body mass index is 25.37 kg/m .  Physical Exam   GENERAL: healthy, alert and no distress  EYES: Eyes grossly normal to inspection, PERRL and conjunctivae and sclerae normal   (female): normal female external genitalia, normal urethral meatus, vaginal mucosa pink, moist, well rugated, and normal cervix/adnexa/uterus without " masses or discharge  MS: no gross musculoskeletal defects noted, no edema  SKIN: no suspicious lesions or rashes  NEURO: Normal strength and tone, mentation intact and speech normal  PSYCH: mentation appears normal, affect normal/bright    No results found for any visits on 10/03/23.

## 2023-10-05 LAB
BKR LAB AP GYN ADEQUACY: NORMAL
BKR LAB AP GYN INTERPRETATION: NORMAL
BKR LAB AP HPV REFLEX: NORMAL
BKR LAB AP PREVIOUS ABNORMAL: NORMAL
PATH REPORT.COMMENTS IMP SPEC: NORMAL
PATH REPORT.COMMENTS IMP SPEC: NORMAL
PATH REPORT.RELEVANT HX SPEC: NORMAL

## 2023-10-11 LAB
HUMAN PAPILLOMA VIRUS 16 DNA: NEGATIVE
HUMAN PAPILLOMA VIRUS 18 DNA: NEGATIVE
HUMAN PAPILLOMA VIRUS FINAL DIAGNOSIS: NORMAL
HUMAN PAPILLOMA VIRUS OTHER HR: NEGATIVE

## 2023-11-20 ENCOUNTER — OFFICE VISIT (OUTPATIENT)
Dept: FAMILY MEDICINE | Facility: CLINIC | Age: 33
End: 2023-11-20
Payer: COMMERCIAL

## 2023-11-20 VITALS
HEART RATE: 76 BPM | BODY MASS INDEX: 24.96 KG/M2 | SYSTOLIC BLOOD PRESSURE: 112 MMHG | DIASTOLIC BLOOD PRESSURE: 74 MMHG | OXYGEN SATURATION: 97 % | TEMPERATURE: 98.1 F | HEIGHT: 64 IN | WEIGHT: 146.2 LBS

## 2023-11-20 DIAGNOSIS — J40 BRONCHITIS: Primary | ICD-10-CM

## 2023-11-20 DIAGNOSIS — J02.9 SORE THROAT: ICD-10-CM

## 2023-11-20 LAB
DEPRECATED S PYO AG THROAT QL EIA: NEGATIVE
GROUP A STREP BY PCR: NOT DETECTED
SARS-COV-2 RNA RESP QL NAA+PROBE: NEGATIVE

## 2023-11-20 PROCEDURE — 87651 STREP A DNA AMP PROBE: CPT | Performed by: FAMILY MEDICINE

## 2023-11-20 PROCEDURE — 99213 OFFICE O/P EST LOW 20 MIN: CPT | Performed by: FAMILY MEDICINE

## 2023-11-20 PROCEDURE — 87635 SARS-COV-2 COVID-19 AMP PRB: CPT | Performed by: FAMILY MEDICINE

## 2023-11-20 RX ORDER — BENZONATATE 100 MG/1
100 CAPSULE ORAL 3 TIMES DAILY PRN
Qty: 25 CAPSULE | Refills: 0 | Status: SHIPPED | OUTPATIENT
Start: 2023-11-20

## 2023-11-20 RX ORDER — AZITHROMYCIN 250 MG/1
TABLET, FILM COATED ORAL
Qty: 6 TABLET | Refills: 0 | Status: SHIPPED | OUTPATIENT
Start: 2023-11-20 | End: 2023-11-25

## 2023-11-20 ASSESSMENT — PATIENT HEALTH QUESTIONNAIRE - PHQ9
SUM OF ALL RESPONSES TO PHQ QUESTIONS 1-9: 1
SUM OF ALL RESPONSES TO PHQ QUESTIONS 1-9: 1
10. IF YOU CHECKED OFF ANY PROBLEMS, HOW DIFFICULT HAVE THESE PROBLEMS MADE IT FOR YOU TO DO YOUR WORK, TAKE CARE OF THINGS AT HOME, OR GET ALONG WITH OTHER PEOPLE: NOT DIFFICULT AT ALL

## 2023-11-20 ASSESSMENT — ENCOUNTER SYMPTOMS: COUGH: 1

## 2023-11-20 ASSESSMENT — PAIN SCALES - GENERAL: PAINLEVEL: NO PAIN (0)

## 2023-11-20 NOTE — PROGRESS NOTES
Assessment & Plan     Bronchitis  SEE EPIC care orders  The potential side effects of this medication have been discussed with the patient.  Call if any significant problems with these are experienced.  Follow up 1 week if not better/sooner if worse    - azithromycin (ZITHROMAX) 250 MG tablet; Take 2 tablets (500 mg) by mouth daily for 1 day, THEN 1 tablet (250 mg) daily for 4 days.  - benzonatate (TESSALON) 100 MG capsule; Take 1 capsule (100 mg) by mouth 3 times daily as needed for cough  - Symptomatic COVID-19 Virus (Coronavirus) by PCR; Future    Sore throat  Pending labs  - Symptomatic COVID-19 Virus (Coronavirus) by PCR; Future  - Streptococcus A Rapid Screen w/Reflex to PCR - Clinic Collect  Sofi Howe MD  [facility] LILA Sánchez is a 33 year old, presenting for the following health issues:  Cough (X 10 days, negative covid test.)        11/20/2023    10:08 AM   Additional Questions   Roomed by Coleen       History of Present Illness       Reason for visit:  Cough and feeling sick for a little over a week  Symptom onset:  1-2 weeks ago  Symptoms include:  Cough, sore throat, body aches, chest pain  Symptom intensity:  Moderate  Symptom progression:  Staying the same  Had these symptoms before:  No  What makes it worse:  Too much activity  What makes it better:  Resting, relaxing    She eats 2-3 servings of fruits and vegetables daily.She consumes 0 sweetened beverage(s) daily.She exercises with enough effort to increase her heart rate 60 or more minutes per day.  She exercises with enough effort to increase her heart rate 6 days per week.   She is taking medications regularly.   No sob  No wheezing  Chest feels congested    Review of Systems   Respiratory:  Positive for cough.       CONSTITUTIONAL: NEGATIVE for fever, chills, change in weight  INTEGUMENTARY/SKIN: NEGATIVE for worrisome rashes, moles or lesions  ENT/MOUTH: NEGATIVE for ear, mouth and throat problems  RESP:as  "above  CV: NEGATIVE for chest pain, palpitations or peripheral edema  GI: NEGATIVE for nausea, abdominal pain, heartburn, or change in bowel habits  ROS otherwise negative      Objective    /74   Pulse 76   Temp 98.1  F (36.7  C) (Temporal)   Ht 1.627 m (5' 4.06\")   Wt 66.3 kg (146 lb 3.2 oz)   SpO2 97%   BMI 25.05 kg/m    Body mass index is 25.05 kg/m .  Physical Exam   GENERAL: healthy, alert and no distress  EYES: Eyes grossly normal to inspection  HENT: ear canals and TM's normal, nose and mouth without ulcers or lesions  NECK: no adenopathy, no asymmetry, masses, or scars and thyroid normal to palpation  RESP: lungs clear to auscultation - no rales, rhonchi or wheezes  CV: regular rate and rhythm, normal S1 S2, no S3 or S4, no murmur, click or rub, no peripheral edema and peripheral pulses strong  ABDOMEN: soft, nontender, no hepatosplenomegaly, no masses and bowel sounds normal  MS: no gross musculoskeletal defects noted, no edema                "

## 2023-12-28 ENCOUNTER — MYC REFILL (OUTPATIENT)
Dept: FAMILY MEDICINE | Facility: CLINIC | Age: 33
End: 2023-12-28
Payer: COMMERCIAL

## 2023-12-28 DIAGNOSIS — Z30.011 ENCOUNTER FOR INITIAL PRESCRIPTION OF CONTRACEPTIVE PILLS: ICD-10-CM

## 2023-12-29 RX ORDER — NORGESTIMATE AND ETHINYL ESTRADIOL 0.25-0.035
1 KIT ORAL DAILY
Qty: 84 TABLET | Refills: 3 | OUTPATIENT
Start: 2023-12-29

## 2024-04-02 ENCOUNTER — PATIENT OUTREACH (OUTPATIENT)
Dept: CARE COORDINATION | Facility: CLINIC | Age: 34
End: 2024-04-02
Payer: COMMERCIAL

## 2024-04-03 DIAGNOSIS — Z30.011 ENCOUNTER FOR INITIAL PRESCRIPTION OF CONTRACEPTIVE PILLS: ICD-10-CM

## 2024-04-03 RX ORDER — NORGESTIMATE AND ETHINYL ESTRADIOL 0.25-0.035
1 KIT ORAL DAILY
Qty: 84 TABLET | Refills: 0 | Status: SHIPPED | OUTPATIENT
Start: 2024-04-03 | End: 2024-05-29

## 2024-04-16 ENCOUNTER — PATIENT OUTREACH (OUTPATIENT)
Dept: CARE COORDINATION | Facility: CLINIC | Age: 34
End: 2024-04-16
Payer: COMMERCIAL

## 2024-05-29 DIAGNOSIS — Z30.011 ENCOUNTER FOR INITIAL PRESCRIPTION OF CONTRACEPTIVE PILLS: ICD-10-CM

## 2024-05-29 RX ORDER — NORGESTIMATE AND ETHINYL ESTRADIOL 0.25-0.035
1 KIT ORAL DAILY
Qty: 84 TABLET | Refills: 0 | Status: SHIPPED | OUTPATIENT
Start: 2024-05-29 | End: 2024-08-23

## 2024-06-23 ENCOUNTER — HEALTH MAINTENANCE LETTER (OUTPATIENT)
Age: 34
End: 2024-06-23

## 2024-08-22 DIAGNOSIS — Z30.011 ENCOUNTER FOR INITIAL PRESCRIPTION OF CONTRACEPTIVE PILLS: ICD-10-CM

## 2024-08-23 RX ORDER — NORGESTIMATE AND ETHINYL ESTRADIOL 0.25-0.035
1 KIT ORAL DAILY
Qty: 84 TABLET | Refills: 0 | Status: SHIPPED | OUTPATIENT
Start: 2024-08-23

## 2024-11-01 ENCOUNTER — E-VISIT (OUTPATIENT)
Dept: URGENT CARE | Facility: CLINIC | Age: 34
End: 2024-11-01
Payer: COMMERCIAL

## 2024-11-01 DIAGNOSIS — B37.31 CANDIDAL VULVOVAGINITIS: Primary | ICD-10-CM

## 2024-11-01 PROCEDURE — 99207 PR NON-BILLABLE SERV PER CHARTING: CPT | Performed by: PHYSICIAN ASSISTANT

## 2024-11-01 RX ORDER — FLUCONAZOLE 150 MG/1
150 TABLET ORAL ONCE
Qty: 1 TABLET | Refills: 0 | Status: SHIPPED | OUTPATIENT
Start: 2024-11-01 | End: 2024-11-01

## 2024-11-01 NOTE — PATIENT INSTRUCTIONS
Thank you for choosing us for your care. I have placed an order for a prescription so that you can start treatment. View your full visit summary for details by clicking on the link below. Your pharmacist will able to address any questions you may have about the medication.     If you re not feeling better within 2-3 days, please schedule an appointment.  You can schedule an appointment right here in [facility], or call [phone]  If the visit is for the same symptoms as your eVisit, we ll refund the cost of your eVisit if seen within seven days.    Vaginal Yeast Infection: Care Instructions  Overview     A vaginal yeast infection is the growth of too many yeast cells in the vagina. This is a common problem. Itching, vaginal discharge and irritation, and other symptoms can bother you. But yeast infections don't often cause other health problems.  Some medicines can increase your risk of getting a yeast infection. These include antibiotics, hormones, and steroids. You may also be more likely to get a yeast infection if you are pregnant, have diabetes, douche, or wear tight clothes.  With treatment, most yeast infections get better in a few days.  Follow-up care is a key part of your treatment and safety. Be sure to make and go to all appointments, and call your doctor if you are having problems. It's also a good idea to know your test results and keep a list of the medicines you take.  How can you care for yourself at home?  Take your medicines exactly as prescribed. Call your doctor if you think you are having a problem with your medicine.  Ask your doctor about over-the-counter (OTC) medicines for yeast infections. If you use an OTC treatment, read and follow all instructions on the label.  Don't use tampons while using a vaginal cream or suppository. The tampons can absorb the medicine. Use pads instead.  Wear loose cotton clothing. Don't wear nylon or other fabric that holds body heat and moisture close to the  "skin.  Try sleeping without underwear.  Don't scratch. Relieve itching with a cold pack or a cool bath.  Don't wash your vulva more than once a day. Use plain water or a mild, unscented soap. Air-dry the vulva.  Change out of wet or damp clothes as soon as possible.  If you are using a vaginal medicine, don't have sex until you have finished your treatment. But if you do have sex, don't depend on a condom or diaphragm for birth control. The oil in some vaginal medicines weakens latex.  Don't douche or use powders, sprays, or perfumes in your vagina or on your vulva. These items can change the normal balance of organisms in your vagina.  When should you call for help?   Call your doctor now or seek immediate medical care if:    You have new or increased pain in your vagina or pelvis.   Watch closely for changes in your health, and be sure to contact your doctor if:    You have unexpected vaginal bleeding.     You have a fever.     You are not getting better after 2 days.     Your symptoms come back after you finish your medicines.   Where can you learn more?  Go to https://www.[employer].net/patiented  Enter F639 in the search box to learn more about \"Vaginal Yeast Infection: Care Instructions.\"  Current as of: November 27, 2023  Content Version: 14.2 2024 [email or web address].   Care instructions adapted under license by your healthcare professional. If you have questions about a medical condition or this instruction, always ask your healthcare professional. [employer] disclaims any warranty or liability for your use of this information.    "

## (undated) DEVICE — SU VICRYL 2-0 SH 27" UND J417H

## (undated) DEVICE — DRSG STERI STRIP 1/2X4" R1547

## (undated) DEVICE — GLOVE BIOGEL PI MICRO SZ 7.0 48570

## (undated) DEVICE — PACK UNIVERSAL SPLIT 29131

## (undated) DEVICE — SUCTION MANIFOLD NEPTUNE 2 SYS 1 PORT 702-025-000

## (undated) DEVICE — ESU GROUND PAD ADULT W/CORD E7507

## (undated) DEVICE — DRSG AQUACEL AG 3.5X6.0" HYDROFIBER 412010

## (undated) DEVICE — SPECIMEN CONTAINER 5OZ STERILE 2600SA

## (undated) DEVICE — SOL NACL 0.9% IRRIG 500ML BOTTLE 2F7123

## (undated) DEVICE — LINEN ORTHO PACK 5446

## (undated) DEVICE — PACK HAND CUSTOM ASC

## (undated) DEVICE — CAST PADDING 4" STERILE 9044S

## (undated) DEVICE — PREP POVIDONE-IODINE 7.5% SCRUB 4OZ BOTTLE MDS093945

## (undated) DEVICE — DRAPE STERI TOWEL LG 1010

## (undated) DEVICE — PREP CHLORAPREP 26ML TINTED ORANGE  260815

## (undated) DEVICE — SU SILK 2-0 SH 30" K833H

## (undated) RX ORDER — BUPIVACAINE HYDROCHLORIDE 2.5 MG/ML
INJECTION, SOLUTION EPIDURAL; INFILTRATION; INTRACAUDAL
Status: DISPENSED
Start: 2023-06-15

## (undated) RX ORDER — PROPOFOL 10 MG/ML
INJECTION, EMULSION INTRAVENOUS
Status: DISPENSED
Start: 2023-06-15

## (undated) RX ORDER — ACETAMINOPHEN 325 MG/1
TABLET ORAL
Status: DISPENSED
Start: 2023-06-15

## (undated) RX ORDER — DEXAMETHASONE SODIUM PHOSPHATE 4 MG/ML
INJECTION, SOLUTION INTRA-ARTICULAR; INTRALESIONAL; INTRAMUSCULAR; INTRAVENOUS; SOFT TISSUE
Status: DISPENSED
Start: 2023-06-15

## (undated) RX ORDER — GABAPENTIN 300 MG/1
CAPSULE ORAL
Status: DISPENSED
Start: 2023-06-15

## (undated) RX ORDER — ONDANSETRON 2 MG/ML
INJECTION INTRAMUSCULAR; INTRAVENOUS
Status: DISPENSED
Start: 2023-06-15

## (undated) RX ORDER — CEFAZOLIN SODIUM 2 G/50ML
SOLUTION INTRAVENOUS
Status: DISPENSED
Start: 2023-06-15